# Patient Record
Sex: FEMALE | Race: WHITE | NOT HISPANIC OR LATINO | Employment: OTHER | URBAN - METROPOLITAN AREA
[De-identification: names, ages, dates, MRNs, and addresses within clinical notes are randomized per-mention and may not be internally consistent; named-entity substitution may affect disease eponyms.]

---

## 2017-07-11 ENCOUNTER — OFFICE VISIT (OUTPATIENT)
Dept: URGENT CARE | Facility: URGENT CARE | Age: 82
End: 2017-07-11
Payer: COMMERCIAL

## 2017-07-11 VITALS
WEIGHT: 106.3 LBS | OXYGEN SATURATION: 98 % | DIASTOLIC BLOOD PRESSURE: 86 MMHG | SYSTOLIC BLOOD PRESSURE: 155 MMHG | HEART RATE: 79 BPM | BODY MASS INDEX: 22.22 KG/M2 | TEMPERATURE: 97.8 F

## 2017-07-11 DIAGNOSIS — R30.0 DYSURIA: Primary | ICD-10-CM

## 2017-07-11 DIAGNOSIS — N39.0 ACUTE UTI: ICD-10-CM

## 2017-07-11 LAB
ALBUMIN UR-MCNC: NEGATIVE MG/DL
APPEARANCE UR: CLEAR
BILIRUB UR QL STRIP: NEGATIVE
COLOR UR AUTO: YELLOW
GLUCOSE UR STRIP-MCNC: NEGATIVE MG/DL
HGB UR QL STRIP: NEGATIVE
KETONES UR STRIP-MCNC: ABNORMAL MG/DL
LEUKOCYTE ESTERASE UR QL STRIP: NEGATIVE
NITRATE UR QL: NEGATIVE
NON-SQ EPI CELLS #/AREA URNS LPF: ABNORMAL /LPF
PH UR STRIP: 6.5 PH (ref 5–7)
RBC #/AREA URNS AUTO: ABNORMAL /HPF (ref 0–2)
SP GR UR STRIP: 1.02 (ref 1–1.03)
URN SPEC COLLECT METH UR: ABNORMAL
UROBILINOGEN UR STRIP-ACNC: 0.2 EU/DL (ref 0.2–1)
WBC #/AREA URNS AUTO: ABNORMAL /HPF (ref 0–2)

## 2017-07-11 PROCEDURE — 87086 URINE CULTURE/COLONY COUNT: CPT | Performed by: PHYSICIAN ASSISTANT

## 2017-07-11 PROCEDURE — 99213 OFFICE O/P EST LOW 20 MIN: CPT | Performed by: PHYSICIAN ASSISTANT

## 2017-07-11 PROCEDURE — 81001 URINALYSIS AUTO W/SCOPE: CPT | Performed by: PHYSICIAN ASSISTANT

## 2017-07-11 RX ORDER — CEFDINIR 300 MG/1
300 CAPSULE ORAL 2 TIMES DAILY
Qty: 14 CAPSULE | Refills: 0 | Status: SHIPPED | OUTPATIENT
Start: 2017-07-11 | End: 2017-07-18

## 2017-07-11 NOTE — MR AVS SNAPSHOT
After Visit Summary   7/11/2017    Shira Salas    MRN: [id number]           Patient Information     Date Of Birth          11/18/1928        Visit Information        Provider Department      7/11/2017 9:35 AM Destinee Hickman PA-C [facility]        Today's Diagnoses     Dysuria    -  1    Acute UTI          Care Instructions      ASSESSMENT:   Lower, uncomplicated urinary tract infection.    PLAN:  OMNICEF    Drink plenty of fluids.  Prevention and treatment of UTI's discussed.Signs and symptoms of pyelonephritis mentioned.   Follow up with primary care physician if not improving    Take probiotic while on antibiotic.              Follow-ups after your visit        Your next 10 appointments already scheduled     Aug 23, 2017  9:45 AM CDT   Return Visit with Jennifer Bajwa MD   [facility])    [facility]  [address] [address] [phone]              Who to contact     If you have questions or need follow up information about today's clinic visit or your schedule please contact [facility] directly at [phone].  Normal or non-critical lab and imaging results will be communicated to you by MyChart, letter or phone within 4 business days after the clinic has received the results. If you do not hear from us within 7 days, please contact the clinic through MyChart or phone. If you have a critical or abnormal lab result, we will notify you by phone as soon as possible.  Submit refill requests through [employer] or call your pharmacy and they will forward the refill request to us. Please allow 3 business days for your refill to be completed.          Additional Information About Your Visit        MyChart Information     [employer] lets you send messages to your doctor, view your test results, renew your prescriptions, schedule  "appointments and more. To sign up, go to [email or web address] . Click on \"Log in\" on the left side of the screen, which will take you to the Welcome page. Then click on \"Sign up Now\" on the right side of the page.     You will be asked to enter the access code listed below, as well as some personal information. Please follow the directions to create your username and password.     Your access code is: [email or web address]  Expires: 2017  3:16 PM     Your access code will  in 90 days. If you need help or a new code, please call your [facility] clinic or [phone].        Care EveryWhere ID     This is your Care EveryWhere ID. This could be used by other organizations to access your [facility] medical records  [id number]        Your Vitals Were     Pulse Temperature Pulse Oximetry BMI (Body Mass Index)          79 97.8  F (36.6  C) (Oral) 98% 22.22 kg/m2         Blood Pressure from Last 3 Encounters:   17 155/86   16 126/72   09/10/16 124/68    Weight from Last 3 Encounters:   17 106 lb 4.8 oz (48.2 kg)   16 103 lb 12.8 oz (47.1 kg)   09/10/16 100 lb 8 oz (45.6 kg)              We Performed the Following     UA with Microscopic reflex to Culture     Urine Culture Aerobic Bacterial          Today's Medication Changes          These changes are accurate as of: 17 10:32 AM.  If you have any questions, ask your nurse or doctor.               Start taking these medicines.        Dose/Directions    cefdinir 300 MG capsule   Commonly known as:  OMNICEF   Used for:  Acute UTI   Started by:  Destinee Hickman PA-C        Dose:  300 mg   Take 1 capsule (300 mg) by mouth 2 times daily for 7 days   Quantity:  14 capsule   Refills:  0            Where to get your medicines      These medications were sent to [facility] [address] [place], [place] - [address] AT [facility] [place]  [address], [facility] [address]     Phone:  [phone]     " cefdinir 300 MG capsule                Primary Care Provider    No Pcp Confirmed       No address on file        Equal Access to Services     HIEU SWANMISTI : Hadii maikol clark matthew Berry, wamarciada lusridharmarlineha, mekhi richmarytanya capellanbaraktanya, blane bowermukeshkatharina alvarez. So [facility] [phone].    ATENCIÓN: Si habla español, tiene a fong disposición servicios gratuitos de asistencia lingüística. Llame al [phone].    We comply with applicable federal civil rights laws and [place] laws. We do not discriminate on the basis of race, color, national origin, age, disability sex, sexual orientation or gender identity.            Thank you!     Thank you for choosing [facility]  for your care. Our goal is always to provide you with excellent care. Hearing back from our patients is one way we can continue to improve our services. Please take a few minutes to complete the written survey that you may receive in the mail after your visit with us. Thank you!             Your Updated Medication List - Protect others around you: Learn how to safely use, store and throw away your medicines at www.disposemymeds.org.          This list is accurate as of: 7/11/17 10:32 AM.  Always use your most recent med list.                   Brand Name Dispense Instructions for use Diagnosis    BLINK TEARS OP      Apply 1 drop to eye 4 times daily        calcium carbonate 1250 MG tablet    OS-APOLONIA 500 mg [place]. Ca     Take 500 mg by mouth 2 times daily        cefdinir 300 MG capsule    OMNICEF    14 capsule    Take 1 capsule (300 mg) by mouth 2 times daily for 7 days    Acute UTI       cefuroxime 500 MG tablet    CEFTIN    20 tablet    Take 1 tablet (500 mg) by mouth 2 times daily    Acute UTI       cephALEXin 500 MG capsule    KEFLEX    20 capsule    Take 1 capsule (500 mg) by mouth 2 times daily    Dysuria       * Multi-vitamin Tabs tablet      Take 1 tablet by mouth daily AREDS FOCUS        * MULTIVITAMIN PO       Take 1 tablet by mouth daily        VITAMIN D3 PO      Take by mouth daily        * Notice:  This list has 2 medication(s) that are the same as other medications prescribed for you. Read the directions carefully, and ask your doctor or other care provider to review them with you.

## 2017-07-11 NOTE — PROGRESS NOTES
SUBJECTIVE:   Shira Salas is a 88 year old female who  presents today for a possible UTI. Symptoms of urgency and frequency have been going on for 1day(s).  Hematuria no.  sudden onsetand moderate.  There is no history of fever, chills, nausea or vomiting.  No history of vaginal  discharge. This patient does have a history of urinary tract infections. Patient denies long duration, rigors, flank pain, temperature > 101 degrees F. and Vomiting, significant nausea or diarrhea.     Past Medical History:   Diagnosis Date     Glaucoma      Macular degeneration      Patient Active Problem List   Diagnosis     Primary open angle glaucoma     PXG (pseudoexfoliation glaucoma), mild stage     Pseudophakia of both eyes     PXG (pseudoexfoliation glaucoma), moderate stage     Social History   Substance Use Topics     Smoking status: Never Smoker     Smokeless tobacco: Not on file     Alcohol use Not on file       ROS:   CONSTITUTIONAL:NEGATIVE for fever, chills, change in weight  : as per HPI    OBJECTIVE:  /86 (BP Location: Left arm, Patient Position: Chair, Cuff Size: Adult Regular)  Pulse 79  Temp 97.8  F (36.6  C) (Oral)  Wt 106 lb 4.8 oz (48.2 kg)  SpO2 98%  BMI 22.22 kg/m2  GENERAL APPEARANCE: healthy, alert and no distress  ABDOMEN:  soft, nontender, no HSM or masses and bowel sounds normal  BACK: No CVA tenderness  SKIN: no suspicious lesions or rashes    ASSESSMENT:   Lower, uncomplicated urinary tract infection.    PLAN:  OMNICEF  As per ordered above  Drink plenty of fluids.  Prevention and treatment of UTI's discussed.Signs and symptoms of pyelonephritis mentioned.  Follow up with primary care physician if not improving  Take probiotic while on antibiotic.    Patient expresses understanding and agreement with the assessment and plan as above.

## 2017-07-11 NOTE — PATIENT INSTRUCTIONS
ASSESSMENT:   Lower, uncomplicated urinary tract infection.    PLAN:  OMNICEF    Drink plenty of fluids.  Prevention and treatment of UTI's discussed.Signs and symptoms of pyelonephritis mentioned.   Follow up with primary care physician if not improving    Take probiotic while on antibiotic.

## 2017-07-11 NOTE — NURSING NOTE
"Chief Complaint   Patient presents with     UTI     pt. stated that she use the restoom more than normal. pt. denies any odor or lower abdominal pain. 1x day       Initial /86 (BP Location: Left arm, Patient Position: Chair, Cuff Size: Adult Regular)  Pulse 79  Temp 97.8  F (36.6  C) (Oral)  Wt 106 lb 4.8 oz (48.2 kg)  SpO2 98%  BMI 22.22 kg/m2 Estimated body mass index is 22.22 kg/(m^2) as calculated from the following:    Height as of 12/14/16: 4' 10\" (1.473 m).    Weight as of this encounter: 106 lb 4.8 oz (48.2 kg).  Medication Reconciliation: complete    "

## 2017-07-12 LAB
BACTERIA SPEC CULT: NORMAL
MICRO REPORT STATUS: NORMAL
SPECIMEN SOURCE: NORMAL

## 2017-08-23 ENCOUNTER — OFFICE VISIT (OUTPATIENT)
Dept: OPHTHALMOLOGY | Facility: CLINIC | Age: 82
End: 2017-08-23

## 2017-08-23 DIAGNOSIS — Z96.1 PSEUDOPHAKIA OF BOTH EYES: ICD-10-CM

## 2017-08-23 DIAGNOSIS — H40.1492 PXG (PSEUDOEXFOLIATION GLAUCOMA), MODERATE STAGE: Primary | ICD-10-CM

## 2017-08-23 ASSESSMENT — REFRACTION_WEARINGRX
OD_AXIS: 001
OS_ADD: +2.50
OS_CYLINDER: +1.00
OS_AXIS: 168
OD_ADD: +2.50
OD_CYLINDER: +2.00
OS_SPHERE: -1.00
OD_SPHERE: -0.25

## 2017-08-23 ASSESSMENT — EXTERNAL EXAM - LEFT EYE: OS_EXAM: NORMAL

## 2017-08-23 ASSESSMENT — SLIT LAMP EXAM - LIDS
COMMENTS: NORMAL
COMMENTS: NORMAL

## 2017-08-23 ASSESSMENT — CONF VISUAL FIELD
OS_NORMAL: 1
OD_NORMAL: 1

## 2017-08-23 ASSESSMENT — VISUAL ACUITY
METHOD: SNELLEN - LINEAR
OD_CC: 20/20
OS_CC+: +1
CORRECTION_TYPE: GLASSES
OS_CC: 20/30

## 2017-08-23 ASSESSMENT — EXTERNAL EXAM - RIGHT EYE: OD_EXAM: NORMAL

## 2017-08-23 ASSESSMENT — TONOMETRY
IOP_METHOD: APPLANATION
OD_IOP_MMHG: 18
OS_IOP_MMHG: 15

## 2017-08-23 ASSESSMENT — CUP TO DISC RATIO
OS_RATIO: 0.45
OD_RATIO: 0.2

## 2017-08-23 NOTE — PROGRESS NOTES
1)PXG -- s/p Ahmed Valve OS (2/15/16), s/p SLT OS x4 (4/07, 6/11, 7/14 and 12/28/15), first told about glaucoma in early 2000s, H/O PXE per old notes -- K pachy: 597/611 Tmax:20/41 (OS:49 POD#1 after CE, often Z60h-65 from 9296-2697) HVF: OD:Full and OS:SN dec sens and HVF:Full on 7/14 CDR: 0.2/0.4 HRT/OCT: OD:RNFL WNL and OS:tr RNFL thinning FHX of Glc: sister/brother -- gtts, did not lose vision Gonio:open Intolerant to: Asthma/COPD:No Steroid Use: No Kidney Stones: No Sulfa Allergy:Yes, hives many years ago IOP targets:L-M20s -- IOP good  2)PCIOL OU   3)DENZEL    Patient will return to clinic in 6-8 months with visual field test (left eye only) and IOP check. Patient will start warm compresses 2x/day, increase dietary flax seed/fish oil dietary supplementation, and start artificial tears 4-6x/day as needed for burning, tearing, mattering, foreign body sensation, blurred vision, etc.  Artifical tear drops are available over the counter. Below are a list of the some of the drops available:  Refresh   Systane  Theratears  Genteal  Blink  Optive      Please avoid Visine (unless Visine Puretears), Murine or Cleareyes or Puralube tear drops.    These have ingredients that take the red out and but actually increase dryness and redness of the eyes over time    Attending Physician Attestation:  Complete documentation of historical and exam elements from today's encounter can be found in the full encounter summary report (not reduplicated in this progress note). I personally obtained the chief complaint(s) and history of present illness.  I confirmed and edited as necessary the review of systems, past medical/surgical history, family history, social history, and examination findings as documented by others; and I examined the patient myself. I personally reviewed the relevant tests, images, and reports as documented above. I formulated and edited as necessary the assessment and plan and discussed the findings and management  plan with the patient and family.  - Jennifer Bajwa MD

## 2017-08-23 NOTE — NURSING NOTE
Chief Complaints and History of Present Illnesses   Patient presents with     Follow Up For     PXG     HPI    Last Eye Exam:  11/16/16   Affected eye(s):  Both   Symptoms:        Duration:  9 months   Frequency:  Constant       Do you have eye pain now?:  No      Comments:  Pt complains of some blurry vision or eyes feeling tired a lot. Using At's prn. Noticing some floaters still, still look the same and comes and goes occasionally. FARIDA DE LA CRUZ, COA 9:52 AM 08/23/2017

## 2017-08-23 NOTE — PATIENT INSTRUCTIONS
Patient will return to clinic in 6-8 months with visual field test (left eye only) and IOP check. Patient will start warm compresses 2x/day, increase dietary flax seed/fish oil dietary supplementation, and start artificial tears 4-6x/day as needed for burning, tearing, mattering, foreign body sensation, blurred vision, etc.  Artifical tear drops are available over the counter. Below are a list of the some of the drops available:  Refresh   Systane  Theratears  Genteal  Blink  Optive      Please avoid Visine (unless Visine Puretears), Murine or Cleareyes or Puralube tear drops.    These have ingredients that take the red out and but actually increase dryness and redness of the eyes over time

## 2017-08-23 NOTE — MR AVS SNAPSHOT
After Visit Summary   8/23/2017    Shira aSlas    MRN: [id number]           Patient Information     Date Of Birth          11/18/1928        Visit Information        Provider Department      8/23/2017 9:45 AM Jennifer Bajwa MD [facility]        Today's Diagnoses     PXG (pseudoexfoliation glaucoma), moderate stage    -  1    Pseudophakia of both eyes          Care Instructions    Patient will return to clinic in 6-8 months with visual field test (left eye only) and IOP check. Patient will start warm compresses 2x/day, increase dietary flax seed/fish oil dietary supplementation, and start artificial tears 4-6x/day as needed for burning, tearing, mattering, foreign body sensation, blurred vision, etc.  Artifical tear drops are available over the counter. Below are a list of the some of the drops available:  Refresh   Systane  Theratears  Genteal  Blink  Optive      Please avoid Visine (unless Visine Puretears), Murine or Cleareyes or Puralube tear drops.    These have ingredients that take the red out and but actually increase dryness and redness of the eyes over time          Follow-ups after your visit        Follow-up notes from your care team     Return for 6-8 months with visual field test (left eye only) and IOP check.      Your next 10 appointments already scheduled     Feb 28, 2018  9:45 AM CST   Return Visit with Jennifer Bajwa MD   [facility] ([facility])    [facility]  [address] [address] [phone]              Who to contact     Please call your clinic at [phone] to:    Ask questions about your health    Make or cancel appointments    Discuss your medicines    Learn about your test results    Speak to your doctor   If you have compliments or concerns about an experience at your clinic, or if you wish to file a complaint, please contact [facility] Relations at [phone] or email us at [email or web address]         Additional Information About Your Visit        [employer] Information     [employer] is an electronic gateway that provides easy, online access to your medical records. With [employer], you can request a clinic appointment, read your test results, renew a prescription or communicate with your care team.     To sign up for [employer] visit the website at [email or web address]   You will be asked to enter the access code listed below, as well as some personal information. Please follow the directions to create your username and password.     Your access code is: [email or web address]  Expires: 2017  3:16 PM     Your access code will  in 90 days. If you need help or a new code, please contact your [facility] or call [phone] for assistance.        Care EveryWhere ID     This is your Care EveryWhere ID. This could be used by other organizations to access your [facility] medical records  [id number]         Blood Pressure from Last 3 Encounters:   17 155/86   16 126/72   09/10/16 124/68    Weight from Last 3 Encounters:   17 48.2 kg (106 lb 4.8 oz)   16 47.1 kg (103 lb 12.8 oz)   09/10/16 45.6 kg (100 lb 8 oz)              We Performed the Following     OCT Optic Nerve RNFL Spectralis OU (both eyes)     OVF 24-2 Dynamic OS        Primary Care Provider    No Pcp Confirmed       No address on file        Equal Access to Services     HIEU MIGUEL : Hadii aad ku hadasho Soomaali, waaxda luqadaha, qaybta kaalmada adeegyada, blane alvarez. So [facility] [phone].    ATENCIÓN: Si habla español, tiene a fong disposición servicios gratuitos de asistencia lingüística. Llame al [phone].    We comply with applicable federal civil rights laws and [place] laws. We do not discriminate on the basis of race, color, national origin, age,  disability sex, sexual orientation or gender identity.            Thank you!     Thank you for choosing [facility]  for your care. Our goal is always to provide you with excellent care. Hearing back from our patients is one way we can continue to improve our services. Please take a few minutes to complete the written survey that you may receive in the mail after your visit with us. Thank you!             Your Updated Medication List - Protect others around you: Learn how to safely use, store and throw away your medicines at www.disposemymeds.org.          This list is accurate as of: 8/23/17 10:19 AM.  Always use your most recent med list.                   Brand Name Dispense Instructions for use Diagnosis    BLINK TEARS OP      Apply 1 drop to eye 4 times daily        calcium carbonate 1250 MG tablet    OS-APOLONIA 500 mg [place]. Ca     Take 500 mg by mouth 2 times daily        cefuroxime 500 MG tablet    CEFTIN    20 tablet    Take 1 tablet (500 mg) by mouth 2 times daily    Acute UTI       cephALEXin 500 MG capsule    KEFLEX    20 capsule    Take 1 capsule (500 mg) by mouth 2 times daily    Dysuria       * Multi-vitamin Tabs tablet      Take 1 tablet by mouth daily AREDS FOCUS        * MULTIVITAMIN PO      Take 1 tablet by mouth daily        VITAMIN D3 PO      Take by mouth daily        * Notice:  This list has 2 medication(s) that are the same as other medications prescribed for you. Read the directions carefully, and ask your doctor or other care provider to review them with you.

## 2017-09-08 ENCOUNTER — OFFICE VISIT (OUTPATIENT)
Dept: URGENT CARE | Facility: URGENT CARE | Age: 82
End: 2017-09-08
Payer: COMMERCIAL

## 2017-09-08 VITALS
WEIGHT: 107.3 LBS | TEMPERATURE: 98.5 F | SYSTOLIC BLOOD PRESSURE: 108 MMHG | OXYGEN SATURATION: 98 % | BODY MASS INDEX: 22.43 KG/M2 | DIASTOLIC BLOOD PRESSURE: 80 MMHG | HEART RATE: 74 BPM

## 2017-09-08 DIAGNOSIS — N39.0 URINARY TRACT INFECTION WITHOUT HEMATURIA, SITE UNSPECIFIED: Primary | ICD-10-CM

## 2017-09-08 DIAGNOSIS — R30.0 DYSURIA: ICD-10-CM

## 2017-09-08 LAB

## 2017-09-08 PROCEDURE — 99213 OFFICE O/P EST LOW 20 MIN: CPT | Performed by: FAMILY MEDICINE

## 2017-09-08 PROCEDURE — 87186 SC STD MICRODIL/AGAR DIL: CPT | Performed by: FAMILY MEDICINE

## 2017-09-08 PROCEDURE — 81001 URINALYSIS AUTO W/SCOPE: CPT | Performed by: FAMILY MEDICINE

## 2017-09-08 PROCEDURE — 87086 URINE CULTURE/COLONY COUNT: CPT | Performed by: FAMILY MEDICINE

## 2017-09-08 PROCEDURE — 87088 URINE BACTERIA CULTURE: CPT | Performed by: FAMILY MEDICINE

## 2017-09-08 RX ORDER — CIPROFLOXACIN 250 MG/1
250 TABLET, FILM COATED ORAL 2 TIMES DAILY
Qty: 10 TABLET | Refills: 0 | Status: SHIPPED | OUTPATIENT
Start: 2017-09-08 | End: 2017-09-13

## 2017-09-08 NOTE — PROGRESS NOTES
SUBJECTIVE: Shira Salas is a 88 year old female who  presents today for a possible UTI.   Symptoms of dysuria and frequency have been going on for day(s).    Hematuria no.  still presentand mild and moderate.    There is no history of fever, chills, nausea or vomiting.   This patient does have a history of urinary tract infections.   Patient denies long duration and flank pain    Past Medical History:   Diagnosis Date     Glaucoma      Macular degeneration      Allergies   Allergen Reactions     Sulfa Drugs Hives     Social History   Substance Use Topics     Smoking status: Never Smoker     Smokeless tobacco: Never Used     Alcohol use Not on file       ROS: CONSTITUTIONAL:NEGATIVE for fever, chills, change in weight    OBJECTIVE:  /80  Pulse 74  Temp 98.5  F (36.9  C) (Oral)  Wt 107 lb 4.8 oz (48.7 kg)  SpO2 98%  BMI 22.43 kg/m2    No Flank/abd pain      ICD-10-CM    1. Urinary tract infection without hematuria, site unspecified N39.0 ciprofloxacin (CIPRO) 250 MG tablet   2. Dysuria R30.0 UA with Microscopic reflex to Culture     Urine Culture Aerobic Bacterial       Drink plenty of fluids.  Prevention and treatment of UTI's discussed.Signs and symptoms of pyelonephritis mentioned.  Follow up with primary care physician if not improving

## 2017-09-08 NOTE — MR AVS SNAPSHOT
"              After Visit Summary   9/8/2017    Shira Salas    MRN: [id number]           Patient Information     Date Of Birth          11/18/1928        Visit Information        Provider Department      9/8/2017 10:45 AM Jeffery Carney DO [facility]        Today's Diagnoses     Urinary tract infection without hematuria, site unspecified    -  1    Dysuria           Follow-ups after your visit        Your next 10 appointments already scheduled     Feb 28, 2018  9:45 AM CST   Return Visit with Jennifer Bajwa MD   [facility])    [facility]  [address] [facility] [address]  [facility] [address] [phone]              Who to contact     If you have questions or need follow up information about today's clinic visit or your schedule please contact [facility] directly at [phone].  Normal or non-critical lab and imaging results will be communicated to you by MyChart, letter or phone within 4 business days after the clinic has received the results. If you do not hear from us within 7 days, please contact the clinic through cottonTrackshart or phone. If you have a critical or abnormal lab result, we will notify you by phone as soon as possible.  Submit refill requests through [employer]illance or call your pharmacy and they will forward the refill request to us. Please allow 3 business days for your refill to be completed.          Additional Information About Your Visit        cottonTracksharCatalystPharma Information     E[employer] lets you send messages to your doctor, view your test results, renew your prescriptions, schedule appointments and more. To sign up, go to [email or web address] . Click on \"Log in\" on the left side of the screen, which will take you to the Welcome page. Then click on \"Sign up Now\" on the right side of the page.     You will be asked to enter the access code listed below, as well as some " personal information. Please follow the directions to create your username and password.     Your access code is: [email or web address]  Expires: 2017 11:23 AM     Your access code will  in 90 days. If you need help or a new code, please call your [place] clinic or [phone].        Care EveryWhere ID     This is your Care EveryWhere ID. This could be used by other organizations to access your [place] medical records  [id number]        Your Vitals Were     Pulse Temperature Pulse Oximetry BMI (Body Mass Index)          74 98.5  F (36.9  C) (Oral) 98% 22.43 kg/m2         Blood Pressure from Last 3 Encounters:   17 108/80   17 155/86   16 126/72    Weight from Last 3 Encounters:   17 107 lb 4.8 oz (48.7 kg)   17 106 lb 4.8 oz (48.2 kg)   16 103 lb 12.8 oz (47.1 kg)              We Performed the Following     UA with Microscopic reflex to Culture     Urine Culture Aerobic Bacterial          Today's Medication Changes          These changes are accurate as of: 17 11:23 AM.  If you have any questions, ask your nurse or doctor.               Start taking these medicines.        Dose/Directions    ciprofloxacin 250 MG tablet   Commonly known as:  CIPRO   Used for:  Urinary tract infection without hematuria, site unspecified   Started by:  Jeffery Carney DO        Dose:  250 mg   Take 1 tablet (250 mg) by mouth 2 times daily for 5 days   Quantity:  10 tablet   Refills:  0            Where to get your medicines      These medications were sent to [facility] [id number] [place] [address] [address] AT [facility] [address]  [address], [facility] [address]     Phone:  [phone]     ciprofloxacin 250 MG tablet                Primary Care Provider    No Pcp Confirmed       No address on file        Equal Access to Services     HIEU MIGUEL AH: Princess Berry, raine wasserman, blane dove  lenorajose chelimukeshkatharina fisherJohnaajohnson ah. So [facility] [phone].    ATENCIÓN: Si curt west, tiene a fong disposición servicios gratuitos de asistencia lingüística. Donald rangel [phone].    We comply with applicable federal civil rights laws and [place] laws. We do not discriminate on the basis of race, color, national origin, age, disability sex, sexual orientation or gender identity.            Thank you!     Thank you for choosing [facility]  for your care. Our goal is always to provide you with excellent care. Hearing back from our patients is one way we can continue to improve our services. Please take a few minutes to complete the written survey that you may receive in the mail after your visit with us. Thank you!             Your Updated Medication List - Protect others around you: Learn how to safely use, store and throw away your medicines at www.disposemymeds.org.          This list is accurate as of: 9/8/17 11:23 AM.  Always use your most recent med list.                   Brand Name Dispense Instructions for use Diagnosis    BLINK TEARS OP      Apply 1 drop to eye 4 times daily        calcium carbonate 1250 MG tablet    OS-APOLONIA 500 mg [place]. Ca     Take 500 mg by mouth 2 times daily        cefuroxime 500 MG tablet    CEFTIN    20 tablet    Take 1 tablet (500 mg) by mouth 2 times daily    Acute UTI       cephALEXin 500 MG capsule    KEFLEX    20 capsule    Take 1 capsule (500 mg) by mouth 2 times daily    Dysuria       ciprofloxacin 250 MG tablet    CIPRO    10 tablet    Take 1 tablet (250 mg) by mouth 2 times daily for 5 days    Urinary tract infection without hematuria, site unspecified       * Multi-vitamin Tabs tablet      Take 1 tablet by mouth daily AREDS FOCUS        * MULTIVITAMIN PO      Take 1 tablet by mouth daily        VITAMIN D3 PO      Take by mouth daily        * Notice:  This list has 2 medication(s) that are the same as other medications prescribed for you. Read the  directions carefully, and ask your doctor or other care provider to review them with you.

## 2017-09-08 NOTE — NURSING NOTE
"Chief Complaint   Patient presents with     UTI     pt states frequency, disocmfort, funy sensation 3x days        Initial /80  Pulse 74  Temp 98.5  F (36.9  C) (Oral)  Wt 107 lb 4.8 oz (48.7 kg)  SpO2 98%  BMI 22.43 kg/m2 Estimated body mass index is 22.43 kg/(m^2) as calculated from the following:    Height as of 12/14/16: 4' 10\" (1.473 m).    Weight as of this encounter: 107 lb 4.8 oz (48.7 kg).  Medication Reconciliation: complete    "

## 2017-09-09 LAB
BACTERIA SPEC CULT: ABNORMAL
BACTERIA SPEC CULT: ABNORMAL
SPECIMEN SOURCE: ABNORMAL

## 2018-02-20 ENCOUNTER — OFFICE VISIT (OUTPATIENT)
Dept: URGENT CARE | Facility: URGENT CARE | Age: 83
End: 2018-02-20
Payer: COMMERCIAL

## 2018-02-20 VITALS
SYSTOLIC BLOOD PRESSURE: 142 MMHG | BODY MASS INDEX: 22.06 KG/M2 | DIASTOLIC BLOOD PRESSURE: 76 MMHG | WEIGHT: 105.56 LBS | OXYGEN SATURATION: 95 % | TEMPERATURE: 97.9 F | RESPIRATION RATE: 10 BRPM | HEART RATE: 83 BPM

## 2018-02-20 DIAGNOSIS — R30.0 DYSURIA: Primary | ICD-10-CM

## 2018-02-20 DIAGNOSIS — R35.0 URINARY FREQUENCY: ICD-10-CM

## 2018-02-20 LAB
ALBUMIN UR-MCNC: NEGATIVE MG/DL
APPEARANCE UR: CLEAR
BACTERIA #/AREA URNS HPF: ABNORMAL /HPF
BILIRUB UR QL STRIP: ABNORMAL
COLOR UR AUTO: YELLOW
GLUCOSE UR STRIP-MCNC: NEGATIVE MG/DL
HGB UR QL STRIP: NEGATIVE
KETONES UR STRIP-MCNC: 15 MG/DL
LEUKOCYTE ESTERASE UR QL STRIP: ABNORMAL
NITRATE UR QL: NEGATIVE
NON-SQ EPI CELLS #/AREA URNS LPF: ABNORMAL /LPF
PH UR STRIP: 6 PH (ref 5–7)
RBC #/AREA URNS AUTO: ABNORMAL /HPF
SOURCE: ABNORMAL
SP GR UR STRIP: 1.02 (ref 1–1.03)
UROBILINOGEN UR STRIP-ACNC: 0.2 EU/DL (ref 0.2–1)
WBC #/AREA URNS AUTO: ABNORMAL /HPF

## 2018-02-20 PROCEDURE — 81001 URINALYSIS AUTO W/SCOPE: CPT | Performed by: PHYSICIAN ASSISTANT

## 2018-02-20 PROCEDURE — 99213 OFFICE O/P EST LOW 20 MIN: CPT | Performed by: PHYSICIAN ASSISTANT

## 2018-02-20 PROCEDURE — 87086 URINE CULTURE/COLONY COUNT: CPT | Performed by: PHYSICIAN ASSISTANT

## 2018-02-20 RX ORDER — CEFDINIR 300 MG/1
300 CAPSULE ORAL 2 TIMES DAILY
Qty: 14 CAPSULE | Refills: 0 | Status: SHIPPED | OUTPATIENT
Start: 2018-02-20 | End: 2019-11-14

## 2018-02-20 NOTE — PROGRESS NOTES
SUBJECTIVE:   Shira Salas is a 89 year old female who  presents today for a possible UTI. Symptoms of frequency and dysuria have been going on for 3day(s).  Hematuria no.  still presentand mild.  There is no history of fever, chills, nausea or vomiting.   This patient does  have a history of urinary tract infections. Patient denies long duration, rigors, flank pain, temperature > 101 degrees F. and Vomiting, significant nausea or diarrhea or vaginal discharge     Past Medical History:   Diagnosis Date     Glaucoma      Macular degeneration      Allergies   Allergen Reactions     Sulfa Drugs Hives     Social History   Substance Use Topics     Smoking status: Never Smoker     Smokeless tobacco: Never Used     Alcohol use Not on file       ROS:   CONSTITUTIONAL:NEGATIVE for fever, chills, change in weight  INTEGUMENTARY/SKIN: NEGATIVE for worrisome rashes, moles or lesions  RESP:NEGATIVE for significant cough or SOB  CV: NEGATIVE for chest pain, palpitations or peripheral edema  : Positive for urinary frequency and dysuria  MUSCULOSKELETAL: NEGATIVE for significant arthralgias or myalgia  NEURO: NEGATIVE for weakness, dizziness or paresthesias    OBJECTIVE:  /76  Pulse 83  Temp 97.9  F (36.6  C) (Oral)  Resp 10  Wt 105 lb 9 oz (47.9 kg)  SpO2 95%  BMI 22.06 kg/m2  GENERAL APPEARANCE: healthy, alert and no distress  RESP: lungs clear to auscultation - no rales, rhonchi or wheezes  CV: regular rates and rhythm, normal S1 S2, no murmur noted  ABDOMEN:  soft, nontender, no HSM or masses and bowel sounds normal  BACK: No CVA tenderness  SKIN: no suspicious lesions or rashes        ASSESSMENT:   Urinary frequency  Dysuria     PLAN:  Orders Placed This Encounter     UA with Microscopic reflex to Culture     Flaxseed, Linseed, (FLAX SEEDS PO)     Probiotic Product (PROBIOTIC DAILY PO)     cefdinir (OMNICEF) 300 MG capsule       Urine culture pending  Drink plenty of fluids.  Prevention and treatment of UTI's  discussed.Signs and symptoms of pyelonephritis mentioned.  Follow up with primary care physician if not improving

## 2018-02-20 NOTE — MR AVS SNAPSHOT
"              After Visit Summary   2/20/2018    Shira Salas    MRN: [id number]           Patient Information     Date Of Birth          11/18/1928        Visit Information        Provider Department      2/20/2018 9:05 AM Judson Carreno PA-C [facility]        Today's Diagnoses     Dysuria    -  1    Urinary frequency           Follow-ups after your visit        Your next 10 appointments already scheduled     Mar 09, 2018  9:00 AM CST   RETURN GLAUCOMA with Jennifer Bajwa MD   [facility])    [facility]  [address] [address] [phone]              Who to contact     If you have questions or need follow up information about today's clinic visit or your schedule please contact [facility] directly at [phone].  Normal or non-critical lab and imaging results will be communicated to you by MyChart, letter or phone within 4 business days after the clinic has received the results. If you do not hear from us within 7 days, please contact the clinic through Stockrhart or phone. If you have a critical or abnormal lab result, we will notify you by phone as soon as possible.  Submit refill requests through [employer] or call your pharmacy and they will forward the refill request to us. Please allow 3 business days for your refill to be completed.          Additional Information About Your Visit        MyChart Information     [employer] lets you send messages to your doctor, view your test results, renew your prescriptions, schedule appointments and more. To sign up, go to [email or web address] . Click on \"Log in\" on the left side of the screen, which will take you to the Welcome page. Then click on \"Sign up Now\" on the right side of the page.     You will be asked to enter the access code listed below, as well as some personal information. Please follow " the directions to create your username and password.     Your access code is: [email or web address]  Expires: 2018 10:10 AM     Your access code will  in 90 days. If you need help or a new code, please call your [place] clinic or [phone].        Care EveryWhere ID     This is your Care EveryWhere ID. This could be used by other organizations to access your [place] medical records  [id number]        Your Vitals Were     Pulse Temperature Respirations Pulse Oximetry BMI (Body Mass Index)       83 97.9  F (36.6  C) (Oral) 10 95% 22.06 kg/m2        Blood Pressure from Last 3 Encounters:   18 142/76   17 108/80   17 155/86    Weight from Last 3 Encounters:   18 105 lb 9 oz (47.9 kg)   17 107 lb 4.8 oz (48.7 kg)   17 106 lb 4.8 oz (48.2 kg)              We Performed the Following     UA with Microscopic reflex to Culture     Urine Culture Aerobic Bacterial          Today's Medication Changes          These changes are accurate as of 18 10:10 AM.  If you have any questions, ask your nurse or doctor.               Start taking these medicines.        Dose/Directions    cefdinir 300 MG capsule   Commonly known as:  OMNICEF   Used for:  Dysuria, Urinary frequency   Started by:  Judson Carreno PA-C        Dose:  300 mg   Take 1 capsule (300 mg) by mouth 2 times daily   Quantity:  14 capsule   Refills:  0         Stop taking these medicines if you haven't already. Please contact your care team if you have questions.     BLINK TEARS OP   Stopped by:  Judson Carreno PA-C           cefuroxime 500 MG tablet   Commonly known as:  CEFTIN   Stopped by:  Judson Carreno PA-C           cephALEXin 500 MG capsule   Commonly known as:  KEFLEX   Stopped by:  Judson Carreno PA-C                Where to get your medicines      These medications [facility] to [facility] [address] [place] [address] - [address] AT [facility] [address] [place]  [address],  [facility] [address]     Phone:  [phone]     cefdinir 300 MG capsule                Primary Care Provider Fax #    Physician No Ref-Primary [phone]       No address on file        Equal Access to Services     HIEU MIGUEL : Hadii aad ku hadevelia Berry, wamarciada luqadaha, qaybta kaalmada adehosea, blane riley laJohnherbert alvarez. So [facility] [phone].    ATENCIÓN: Si habla español, tiene a fong disposición servicios gratuitos de asistencia lingüística. Llame al [phone].    We comply with applicable federal civil rights laws and [place] laws. We do not discriminate on the basis of race, color, national origin, age, disability, sex, sexual orientation, or gender identity.            Thank you!     Thank you for choosing [facility]  for your care. Our goal is always to provide you with excellent care. Hearing back from our patients is one way we can continue to improve our services. Please take a few minutes to complete the written survey that you may receive in the mail after your visit with us. Thank you!             Your Updated Medication List - Protect others around you: Learn how to safely use, store and throw away your medicines at www.disposemymeds.org.          This list is accurate as of 2/20/18 10:10 AM.  Always use your most recent med list.                   Brand Name Dispense Instructions for use Diagnosis    calcium carbonate 1250 MG tablet    OS-APOLONIA 500 mg [place]. Ca     Take 500 mg by mouth 2 times daily        cefdinir 300 MG capsule    OMNICEF    14 capsule    Take 1 capsule (300 mg) by mouth 2 times daily    Dysuria, Urinary frequency       FLAX SEEDS PO      Take 1 capsule by mouth daily        * Multi-vitamin Tabs tablet      Take 1 tablet by mouth daily AREDS FOCUS        * MULTIVITAMIN PO      Take 1 tablet by mouth daily        PROBIOTIC DAILY PO      Take 1 tablet by mouth daily        VITAMIN D3 PO      Take by mouth daily        *  Notice:  This list has 2 medication(s) that are the same as other medications prescribed for you. Read the directions carefully, and ask your doctor or other care provider to review them with you.

## 2018-02-21 LAB
BACTERIA SPEC CULT: NORMAL
SPECIMEN SOURCE: NORMAL

## 2018-03-09 ENCOUNTER — OFFICE VISIT (OUTPATIENT)
Dept: OPHTHALMOLOGY | Facility: CLINIC | Age: 83
End: 2018-03-09
Payer: COMMERCIAL

## 2018-03-09 DIAGNOSIS — Z96.1 PSEUDOPHAKIA OF BOTH EYES: ICD-10-CM

## 2018-03-09 DIAGNOSIS — H40.1492 PXG (PSEUDOEXFOLIATION GLAUCOMA), MODERATE STAGE: Primary | ICD-10-CM

## 2018-03-09 ASSESSMENT — TONOMETRY
IOP_METHOD: APPLANATION
OD_IOP_MMHG: 18
OS_IOP_MMHG: 12

## 2018-03-09 ASSESSMENT — REFRACTION_MANIFEST
OS_SPHERE: -1.00
OD_AXIS: 180
OS_ADD: +3.00
OS_CYLINDER: +1.25
OS_AXIS: 165
OD_CYLINDER: +2.00
OD_SPHERE: PLANO
OD_ADD: +3.00

## 2018-03-09 ASSESSMENT — VISUAL ACUITY
OS_CC: 20/25
CORRECTION_TYPE: GLASSES
OD_CC+: -
METHOD: SNELLEN - LINEAR
OD_CC: 20/20

## 2018-03-09 ASSESSMENT — CONF VISUAL FIELD
METHOD: COUNTING FINGERS
OD_NORMAL: 1
OS_NORMAL: 1

## 2018-03-09 ASSESSMENT — SLIT LAMP EXAM - LIDS
COMMENTS: NORMAL
COMMENTS: NORMAL

## 2018-03-09 ASSESSMENT — EXTERNAL EXAM - RIGHT EYE: OD_EXAM: NORMAL

## 2018-03-09 ASSESSMENT — EXTERNAL EXAM - LEFT EYE: OS_EXAM: NORMAL

## 2018-03-09 NOTE — MR AVS SNAPSHOT
After Visit Summary   3/9/2018    Shira Salas    MRN: [id number]           Patient Information     Date Of Birth          11/18/1928        Visit Information        Provider Department      3/9/2018 9:00 AM Jennifer Bajwa MD [facility]        Today's Diagnoses     Pxg (pseudoexfoliation glaucoma), moderate stage    -  1    Pseudophakia of both eyes          Care Instructions    Patient will return to clinic in 6-8 months with visual field test, dilated eye exam, OCT with RNFL analysis and IOP check.     Patient will start warm compresses 2x/day, increase dietary flax seed/fish oil dietary supplementation, and start artificial tears 4-6x/day as needed for burning, tearing, mattering, foreign body sensation, blurred vision, etc.  Artifical tear drops are available over the counter. Below are a list of the some of the drops available:  Refresh   Systane  Theratears  Genteal  Blink  Optive      Please avoid Visine (unless Visine Puretears), Murine or Cleareyes or Puralube tear drops.    These have ingredients that take the red out and but actually increase dryness and redness of the eyes over time          Follow-ups after your visit        Follow-up notes from your care team     Return 6-8 months with visual field test, dilated eye exam, OCT with RNFL analysis and IOP check. .      Who to contact     Please call your clinic at [phone] to:    Ask questions about your health    Make or cancel appointments    Discuss your medicines    Learn about your test results    Speak to your doctor            Additional Information About Your Visit        MyChart Information     NanoVib[employer] is an electronic gateway that provides easy, online access to your medical records. With NanoVib[employer], you can request a clinic appointment, read your test results, renew a prescription or communicate with your care team.     To sign up for Elliptic Technologiest visit the website at [email or web address]    You will be asked to enter the access code listed below, as well as some personal information. Please follow the directions to create your username and password.     Your access code is: [email or web address]  Expires: 2018 10:10 AM     Your access code will  in 90 days. If you need help or a new code, please contact your [facility] or call [phone] for assistance.        Care EveryWhere ID     This is your Care EveryWhere ID. This could be used by other organizations to access your [place] medical records  [id number]         Blood Pressure from Last 3 Encounters:   18 142/76   17 108/80   17 155/86    Weight from Last 3 Encounters:   18 47.9 kg (105 lb 9 oz)   17 48.7 kg (107 lb 4.8 oz)   17 48.2 kg (106 lb 4.8 oz)              Today, you had the following     No orders found for display       Primary Care Provider Fax #    Physician No Ref-Primary [phone]       No address on file        Equal Access to Services     [facility] : Hadii maikol Berry, waaxda lurashida, qaybta kaalnanda brown, blane rooney . So [facility] [phone].    ATENCIÓN: Si habla español, tiene a fong disposición servicios gratuitos de asistencia lingüística. [facility] [phone].    We comply with applicable federal civil rights laws and [place] laws. We do not discriminate on the basis of race, color, national origin, age, disability, sex, sexual orientation, or gender identity.            Thank you!     Thank you for choosing [facility]  for your care. Our goal is always to provide you with excellent care. Hearing back from our patients is one way we can continue to improve our services. Please take a few minutes to complete the written survey that you may receive in the mail after your visit with us. Thank you!             Your Updated Medication List - Protect others around you: Learn how  to safely use, store and throw away your medicines at www.disposemymeds.org.          This list is accurate as of 3/9/18 10:01 AM.  Always use your most recent med list.                   Brand Name Dispense Instructions for use Diagnosis    calcium carbonate 1250 MG tablet    OS-APOLONIA 500 mg [place]. Ca     Take 500 mg by mouth 2 times daily        cefdinir 300 MG capsule    OMNICEF    14 capsule    Take 1 capsule (300 mg) by mouth 2 times daily    Dysuria, Urinary frequency       FLAX SEEDS PO      Take 1 capsule by mouth daily        * Multi-vitamin Tabs tablet      Take 1 tablet by mouth daily AREDS FOCUS        * MULTIVITAMIN PO      Take 1 tablet by mouth daily        PROBIOTIC DAILY PO      Take 1 tablet by mouth daily        VITAMIN D3 PO      Take by mouth daily        * Notice:  This list has 2 medication(s) that are the same as other medications prescribed for you. Read the directions carefully, and ask your doctor or other care provider to review them with you.

## 2018-03-09 NOTE — NURSING NOTE
Chief Complaints and History of Present Illnesses   Patient presents with     Glaucoma Follow Up     HPI    Affected eye(s):  Both   Symptoms:     Difficulty with reading   No floaters   Dryness   No eye discharge      Duration:  4 months   Frequency:  Constant       Do you have eye pain now?:  No      Comments:  Blink for dry eyes / tid to BE.    Flori ALEXANDRA 9:30 AM 03/09/2018

## 2018-03-09 NOTE — PROGRESS NOTES
1)PXG -- s/p Ahmed Valve OS (2/15/16), s/p SLT OS x4 (4/07, 6/11, 7/14 and 12/28/15), first told about glaucoma in early 2000s, H/O PXE per old notes -- K pachy: 597/611 Tmax:20/41 (OS:49 POD#1 after CE, often V80o-09 from 4569-0778) HVF: OD:Full and OS:SN dec sens and HVF:Full on 7/14 CDR: 0.2/0.4 HRT/OCT: OD:RNFL WNL and OS:tr RNFL thinning FHX of Glc: sister/brother -- gtts, did not lose vision Gonio:open Intolerant to: Asthma/COPD:No Steroid Use: No Kidney Stones: No Sulfa Allergy:Yes, hives many years ago IOP targets:L-M20s -- IOP good  2)PCIOL OU   3)DENZEL    Patient will return to clinic in 6-8 months with visual field test, dilated eye exam, OCT with RNFL analysis and IOP check.     Patient will start warm compresses 2x/day, increase dietary flax seed/fish oil dietary supplementation, and start artificial tears 4-6x/day as needed for burning, tearing, mattering, foreign body sensation, blurred vision, etc.  Artifical tear drops are available over the counter. Below are a list of the some of the drops available:  Refresh   Systane  Theratears  Genteal  Blink  Optive      Please avoid Visine (unless Visine Puretears), Murine or Cleareyes or Puralube tear drops.    These have ingredients that take the red out and but actually increase dryness and redness of the eyes over time    Attending Physician Attestation:  Complete documentation of historical and exam elements from today's encounter can be found in the full encounter summary report (not reduplicated in this progress note). I personally obtained the chief complaint(s) and history of present illness.  I confirmed and edited as necessary the review of systems, past medical/surgical history, family history, social history, and examination findings as documented by others; and I examined the patient myself. I personally reviewed the relevant tests, images, and reports as documented above. I formulated and edited as necessary the assessment and plan and discussed  the findings and management plan with the patient and family.  - Jennifer Bajwa MD

## 2018-03-09 NOTE — PATIENT INSTRUCTIONS
Patient will return to clinic in 6-8 months with visual field test, dilated eye exam, OCT with RNFL analysis and IOP check.     Patient will start warm compresses 2x/day, increase dietary flax seed/fish oil dietary supplementation, and start artificial tears 4-6x/day as needed for burning, tearing, mattering, foreign body sensation, blurred vision, etc.  Artifical tear drops are available over the counter. Below are a list of the some of the drops available:  Refresh   Systane  Theratears  Genteal  Blink  Optive      Please avoid Visine (unless Visine Puretears), Murine or Cleareyes or Puralube tear drops.    These have ingredients that take the red out and but actually increase dryness and redness of the eyes over time

## 2019-02-08 ENCOUNTER — OFFICE VISIT (OUTPATIENT)
Dept: OPHTHALMOLOGY | Facility: CLINIC | Age: 84
End: 2019-02-08
Payer: COMMERCIAL

## 2019-02-08 DIAGNOSIS — Z96.1 PSEUDOPHAKIA OF BOTH EYES: ICD-10-CM

## 2019-02-08 DIAGNOSIS — H40.1492 PXG (PSEUDOEXFOLIATION GLAUCOMA), MODERATE STAGE: Primary | ICD-10-CM

## 2019-02-08 ASSESSMENT — REFRACTION_WEARINGRX
OD_SPHERE: PLANO
OS_SPHERE: -0.75
OD_CYLINDER: +2.00
OS_AXIS: 165
OD_AXIS: 180
OS_ADD: +3.00
OS_CYLINDER: +1.00
OD_ADD: +3.00

## 2019-02-08 ASSESSMENT — CUP TO DISC RATIO
OD_RATIO: 0.2
OS_RATIO: 0.45

## 2019-02-08 ASSESSMENT — VISUAL ACUITY
OS_CC: 20/20
CORRECTION_TYPE: GLASSES
OS_CC+: -2
OD_CC: 20/20
OD_CC+: -2
METHOD: SNELLEN - LINEAR

## 2019-02-08 ASSESSMENT — TONOMETRY
IOP_METHOD: APPLANATION
OD_IOP_MMHG: 23
IOP_METHOD: APPLANATION
OS_IOP_MMHG: 13
OS_IOP_MMHG: 15
OD_IOP_MMHG: 20

## 2019-02-08 ASSESSMENT — EXTERNAL EXAM - RIGHT EYE: OD_EXAM: NORMAL

## 2019-02-08 ASSESSMENT — CONF VISUAL FIELD
METHOD: COUNTING FINGERS
OD_NORMAL: 1
OS_NORMAL: 1

## 2019-02-08 ASSESSMENT — SLIT LAMP EXAM - LIDS
COMMENTS: NORMAL
COMMENTS: NORMAL

## 2019-02-08 ASSESSMENT — EXTERNAL EXAM - LEFT EYE: OS_EXAM: NORMAL

## 2019-02-08 NOTE — PATIENT INSTRUCTIONS
Patient will return to clinic in 6-8 months with visual field test.       Patient will start warm compresses 2x/day, increase dietary flax seed/fish oil dietary supplementation, and start artificial tears 4-6x/day as needed for burning, tearing, mattering, foreign body sensation, blurred vision, etc.  Artifical tear drops are available over the counter. Below are a list of the some of the drops available:  Refresh   Systane  Theratears  Genteal  Blink  Optive      Please avoid Visine (unless Visine Puretears), Murine or Cleareyes or Puralube tear drops.    These have ingredients that take the red out and but actually increase dryness and redness of the eyes over time

## 2019-02-08 NOTE — NURSING NOTE
Chief Complaints and History of Present Illnesses   Patient presents with     Glaucoma     Chief Complaint(s) and History of Present Illness(es)     Glaucoma     Laterality: both eyes    Quality: States va is the same since last visit                Comments     +geena Adkins COT 9:52 AM February 8, 2019

## 2019-02-08 NOTE — PROGRESS NOTES
1)PXG -- s/p Ahmed Valve OS (2/15/16), s/p SLT OS x4 (4/07, 6/11, 7/14 and 12/28/15), first told about glaucoma in early 2000s, H/O PXE per old notes -- K pachy: 597/611 Tmax:23/41 (OS:49 POD#1 after CE, often V23s-54 from [id number]) HVF: OD:Full and OS:SN dec sens and HVF:Full on 7/14 CDR: 0.2/0.4 HRT/OCT: OD:RNFL WNL and OS:tr RNFL thinning FHX of Glc: sister/brother -- gtts, did not lose vision Gonio:open Intolerant to: Asthma/COPD:No Steroid Use: No Kidney Stones: No Sulfa Allergy:Yes, hives many years ago IOP targets:L-M20s -- IOP good  2)PCIOL OU   3)DENZEL    Patient will return to clinic in 6-8 months with visual field test.       Patient will start warm compresses 2x/day, increase dietary flax seed/fish oil dietary supplementation, and start artificial tears 4-6x/day as needed for burning, tearing, mattering, foreign body sensation, blurred vision, etc.  Artifical tear drops are available over the counter. Below are a list of the some of the drops available:  Refresh   Systane  Theratears  Genteal  Blink  Optive      Please avoid Visine (unless Visine Puretears), Murine or Cleareyes or Puralube tear drops.    These have ingredients that take the red out and but actually increase dryness and redness of the eyes over time    Attending Physician Attestation:  Complete documentation of historical and exam elements from today's encounter can be found in the full encounter summary report (not reduplicated in this progress note). I personally obtained the chief complaint(s) and history of present illness.  I confirmed and edited as necessary the review of systems, past medical/surgical history, family history, social history, and examination findings as documented by others; and I examined the patient myself. I personally reviewed the relevant tests, images, and reports as documented above. I formulated and edited as necessary the assessment and plan and discussed the findings and management plan with the patient and  family.  - Jennifer Bajwa MD

## 2019-03-12 ENCOUNTER — OFFICE VISIT (OUTPATIENT)
Dept: URGENT CARE | Facility: URGENT CARE | Age: 84
End: 2019-03-12
Payer: COMMERCIAL

## 2019-03-12 VITALS
BODY MASS INDEX: 22.03 KG/M2 | SYSTOLIC BLOOD PRESSURE: 162 MMHG | WEIGHT: 105.4 LBS | OXYGEN SATURATION: 97 % | TEMPERATURE: 98.5 F | DIASTOLIC BLOOD PRESSURE: 79 MMHG | HEART RATE: 73 BPM

## 2019-03-12 DIAGNOSIS — R30.0 DYSURIA: ICD-10-CM

## 2019-03-12 DIAGNOSIS — N39.0 ACUTE UTI: Primary | ICD-10-CM

## 2019-03-12 LAB
ALBUMIN UR-MCNC: NEGATIVE MG/DL
APPEARANCE UR: CLEAR
BILIRUB UR QL STRIP: NEGATIVE
COLOR UR AUTO: YELLOW
GLUCOSE UR STRIP-MCNC: NEGATIVE MG/DL
HGB UR QL STRIP: NEGATIVE
KETONES UR STRIP-MCNC: NEGATIVE MG/DL
LEUKOCYTE ESTERASE UR QL STRIP: NEGATIVE
NITRATE UR QL: NEGATIVE
PH UR STRIP: 7.5 PH (ref 5–7)
RBC #/AREA URNS AUTO: ABNORMAL /HPF
SOURCE: ABNORMAL
SP GR UR STRIP: 1.01 (ref 1–1.03)
UROBILINOGEN UR STRIP-ACNC: 0.2 EU/DL (ref 0.2–1)
WBC #/AREA URNS AUTO: ABNORMAL /HPF

## 2019-03-12 PROCEDURE — 99213 OFFICE O/P EST LOW 20 MIN: CPT | Performed by: PHYSICIAN ASSISTANT

## 2019-03-12 PROCEDURE — 81001 URINALYSIS AUTO W/SCOPE: CPT | Performed by: PHYSICIAN ASSISTANT

## 2019-03-12 PROCEDURE — 87086 URINE CULTURE/COLONY COUNT: CPT | Performed by: PHYSICIAN ASSISTANT

## 2019-03-12 RX ORDER — CEFUROXIME AXETIL 250 MG/1
250 TABLET ORAL 2 TIMES DAILY
Qty: 10 TABLET | Refills: 0 | Status: SHIPPED | OUTPATIENT
Start: 2019-03-12 | End: 2019-03-17

## 2019-03-12 NOTE — PROGRESS NOTES
"Patient presents with:  Urgent Care  UTI: sxs 1x days     SUBJECTIVE:   Shira Salas is a 89 year old female who  presents today for a possible UTI. Symptoms of frequency have been going since yesterday.      Complains of \"feeling ishy\" today.     No fevers.     In past has had a negative urine culture when she has had urinary frequency, but today she also has an \"ill feeling\".     Denies any nausea or vomiting.        Past Medical History:   Diagnosis Date     Glaucoma      Macular degeneration      Patient Active Problem List   Diagnosis     Primary open angle glaucoma     Pxg (pseudoexfoliation glaucoma), mild stage     Pseudophakia of both eyes     Pxg (pseudoexfoliation glaucoma), moderate stage     Social History     Tobacco Use     Smoking status: Never Smoker     Smokeless tobacco: Never Used   Substance Use Topics     Alcohol use: Not on file       ROS:   CONSTITUTIONAL:NEGATIVE for fever, chills, change in weight  INTEGUMENTARY/SKIN: NEGATIVE for worrisome rashes, moles or lesions  ENT/MOUTH: NEGATIVE for ear, mouth and throat problems  RESP:NEGATIVE for significant cough or SOB  CV: NEGATIVE for chest pain, palpitations or peripheral edema  : as per HPI  MUSCULOSKELETAL: NEGATIVE for significant arthralgias or myalgia  Review of systems negative except as stated above.    OBJECTIVE:  /79   Pulse 73   Temp 98.5  F (36.9  C) (Oral)   Wt 47.8 kg (105 lb 6.4 oz)   SpO2 97%   BMI 22.03 kg/m    GENERAL APPEARANCE: healthy, alert and no distress  ABDOMEN:  soft, nontender, no HSM or masses and bowel sounds normal  BACK: No CVA tenderness  SKIN: no suspicious lesions or rashes    ASSESSMENT:   Lower, uncomplicated urinary tract infection.    PLAN:  CEFTIN  As per ordered above  Drink plenty of fluids.  Prevention and treatment of UTI's discussed.Signs and symptoms of pyelonephritis mentioned.  Follow up with primary care physician if not improving    "

## 2019-03-12 NOTE — PATIENT INSTRUCTIONS
(N39.0) Acute UTI  (primary encounter diagnosis)  Comment:   Plan: cefuroxime (CEFTIN) 250 MG tablet          Plan: UA with Microscopic reflex to Culture  Culture pending

## 2019-03-14 LAB
BACTERIA SPEC CULT: NORMAL
SPECIMEN SOURCE: NORMAL

## 2019-04-26 ENCOUNTER — TELEPHONE (OUTPATIENT)
Dept: OPHTHALMOLOGY | Facility: CLINIC | Age: 84
End: 2019-04-26

## 2019-08-08 ENCOUNTER — OFFICE VISIT (OUTPATIENT)
Dept: URGENT CARE | Facility: URGENT CARE | Age: 84
End: 2019-08-08
Payer: COMMERCIAL

## 2019-08-08 VITALS
SYSTOLIC BLOOD PRESSURE: 122 MMHG | OXYGEN SATURATION: 98 % | RESPIRATION RATE: 16 BRPM | TEMPERATURE: 97.6 F | WEIGHT: 103 LBS | BODY MASS INDEX: 21.62 KG/M2 | DIASTOLIC BLOOD PRESSURE: 80 MMHG | HEIGHT: 58 IN | HEART RATE: 72 BPM

## 2019-08-08 DIAGNOSIS — R30.0 DYSURIA: Primary | ICD-10-CM

## 2019-08-08 DIAGNOSIS — E86.0 DEHYDRATION: ICD-10-CM

## 2019-08-08 LAB
ALBUMIN UR-MCNC: NEGATIVE MG/DL
APPEARANCE UR: CLEAR
BILIRUB UR QL STRIP: NEGATIVE
COLOR UR AUTO: YELLOW
GLUCOSE UR STRIP-MCNC: NEGATIVE MG/DL
HGB UR QL STRIP: NEGATIVE
KETONES UR STRIP-MCNC: ABNORMAL MG/DL
LEUKOCYTE ESTERASE UR QL STRIP: NEGATIVE
NITRATE UR QL: NEGATIVE
PH UR STRIP: 7.5 PH (ref 5–7)
RBC #/AREA URNS AUTO: ABNORMAL /HPF
SOURCE: ABNORMAL
SP GR UR STRIP: 1.01 (ref 1–1.03)
UROBILINOGEN UR STRIP-ACNC: 0.2 EU/DL (ref 0.2–1)
WBC #/AREA URNS AUTO: ABNORMAL /HPF

## 2019-08-08 PROCEDURE — 99213 OFFICE O/P EST LOW 20 MIN: CPT | Performed by: FAMILY MEDICINE

## 2019-08-08 PROCEDURE — 81001 URINALYSIS AUTO W/SCOPE: CPT | Performed by: FAMILY MEDICINE

## 2019-08-08 RX ORDER — IBUPROFEN 200 MG
200 TABLET ORAL EVERY 4 HOURS PRN
COMMUNITY
End: 2022-08-17

## 2019-08-08 ASSESSMENT — MIFFLIN-ST. JEOR: SCORE: 769.01

## 2019-08-08 ASSESSMENT — PAIN SCALES - GENERAL: PAINLEVEL: NO PAIN (0)

## 2019-08-08 NOTE — PROGRESS NOTES
SUBJECTIVE:  Chief Complaint   Patient presents with     Urgent Care     Dysuria     Feels run down, frequent urination x last pm       Shira Salas is a 89 year old female who  presents today for a possible UTI. Symptoms of frequency and some fatigue, mild light-headedness have been going on for 1day(s).  Hematuria no.  gradual onset, still present and improved and mild.  There is no history of fever, chills, nausea or vomiting.  No history of vaginal  discharge. This patient does   have a history of urinary tract infections.  She has chronic urinary incontinence, wears a pad, and she tries to drink little water to avoid producing much urine.  She was walking yesterday in the hot sun. Patient denies long duration, rigors, flank pain, temperature > 101 degrees F. and Vomiting, significant nausea or diarrhea or vaginal discharge, vaginal odor and vaginal itching     Past Medical History:   Diagnosis Date     Glaucoma      Macular degeneration      Patient Active Problem List   Diagnosis     Primary open angle glaucoma     Pxg (pseudoexfoliation glaucoma), mild stage     Pseudophakia of both eyes     Pxg (pseudoexfoliation glaucoma), moderate stage       ALLERGIES:  Sulfa drugs      Current Outpatient Medications on File Prior to Visit:  calcium carbonate (OS-APOLONIA 500 MG [place]. CA) 500 MG tablet Take 500 mg by mouth 2 times daily   Cholecalciferol (VITAMIN D3 PO) Take by mouth daily   Flaxseed, Linseed, (FLAX SEEDS PO) Take 1 capsule by mouth daily   ibuprofen (ADVIL/MOTRIN) 200 MG tablet Take 200 mg by mouth every 4 hours as needed for mild pain   Multiple Vitamins-Minerals (MULTIVITAMIN PO) Take 1 tablet by mouth daily   multivitamin, therapeutic with minerals (MULTI-VITAMIN) TABS Take 1 tablet by mouth daily AREDS FOCUS   Probiotic Product (PROBIOTIC DAILY PO) Take 1 tablet by mouth daily   cefdinir (OMNICEF) 300 MG capsule Take 1 capsule (300 mg) by mouth 2 times daily (Patient not taking: Reported on 8/8/2019)  "  [] cefuroxime (CEFTIN) 250 MG tablet Take 1 tablet (250 mg) by mouth 2 times daily for 5 days     No current facility-administered medications on file prior to visit.     Social History     Tobacco Use     Smoking status: Never Smoker     Smokeless tobacco: Never Used   Substance Use Topics     Alcohol use: Not on file       Family History   Problem Relation Age of Onset     Glaucoma Brother      Glaucoma Sister      Macular Degeneration Mother      Retinal detachment No family hx of      Amblyopia No family hx of        ROS:   CONSTITUTIONAL:NEGATIVE for fever, chills, change in weight  INTEGUMENTARY/SKIN: NEGATIVE for worrisome rashes, moles or lesions  EYES: NEGATIVE for vision changes or irritation  ENT/MOUTH: NEGATIVE for ear, mouth and throat problems  RESP:NEGATIVE for significant cough or SOB    OBJECTIVE:  /80 (BP Location: Left arm, Patient Position: Sitting, Cuff Size: Adult Regular)   Pulse 72   Temp 97.6  F (36.4  C) (Tympanic)   Resp 16   Ht 1.461 m (4' 9.5\")   Wt 46.7 kg (103 lb)   LMP  (LMP Unknown)   SpO2 98%   Breastfeeding? No   BMI 21.90 kg/m    GENERAL APPEARANCE: alert, mild distress and cooperative  EYES: Eyes grossly normal to inspection, PERRL and conjunctivae and sclerae normal  HENT: TM's normal bilaterally and nose and mouth without erythema, ulcers or lesions  NECK: no adenopathy and normal ROM  RESP: lungs clear to auscultation - no rales, rhonchi or wheezes  CV: regular rates and rhythm, normal S1 S2, no murmur noted  ABDOMEN:  soft, nontender, no HSM or masses and bowel sounds normal  BACK: No CVA tenderness  SKIN: no suspicious lesions or rashes  NEURO: Normal strength and tone, sensory exam grossly normal,  normal speech and mentation.   Walks on toes/ heels/ tandem walk without difficulty    Results for orders placed or performed in visit on 19   UA with Microscopic reflex to Culture   Result Value Ref Range    Color Urine Yellow     Appearance Urine " Clear     Glucose Urine Negative NEG^Negative mg/dL    Bilirubin Urine Negative NEG^Negative    Ketones Urine Trace (A) NEG^Negative mg/dL    Specific Gravity Urine 1.015 1.003 - 1.035    pH Urine 7.5 (H) 5.0 - 7.0 pH    Protein Albumin Urine Negative NEG^Negative mg/dL    Urobilinogen Urine 0.2 0.2 - 1.0 EU/dL    Nitrite Urine Negative NEG^Negative    Blood Urine Negative NEG^Negative    Leukocyte Esterase Urine Negative NEG^Negative    Source Midstream Urine     WBC Urine 0 - 5 OTO5^0 - 5 /HPF    RBC Urine O - 2 OTO2^O - 2 /HPF         ASSESSMENT:   Dysuria     - UA with Microscopic reflex to Culture    Reassured no signs of UTI    Dehydration     Drink plenty of fluids.   Especially when exposed to sun or hot weather  Rest for today

## 2019-08-08 NOTE — PATIENT INSTRUCTIONS
Patient Education     Dehydration (Adult)  Dehydration occurs when your body loses too much fluid. This may be the result of prolonged vomiting or diarrhea, excessive sweating, or a high fever. It may also happen if you don t drink enough fluid when you re sick or out in the heat. Misuse of diuretics (water pills) can also be a cause.  Symptoms include thirst and decreased urine output. You may also feel dizzy, weak, fatigued, or very drowsy. The diet described below is usually enough to treat dehydration. In some cases, you may need medicine.  Home care    Drink at least 12 8-ounce glasses of fluid every day to resolve the dehydration. Fluid may include water; orange juice; lemonade; apple, grape, or cranberry juice; clear fruit drinks; electrolyte replacement and sports drinks; and teas and coffee without caffeine. Don't drink alcohol. If you have been diagnosed with a kidney disease, ask your doctor how much and what types of fluids you should drink to prevent dehydration. If you have kidney disease, fluid can build up in the body. This can be dangerous to your health.    If you have a fever, muscle aches, or a headache as a result of a cold or flu, you may take acetaminophen or ibuprofen, unless another medicine was prescribed. If you have chronic liver or kidney disease, or have ever had a stomach ulcer or gastrointestinal bleeding, talk with your healthcare provider before using these medicines. Don't take aspirin if you are younger than 18 and have a fever. Aspirin raises the chance for severe liver injury.  Follow-up care  Follow up with your healthcare provider, or as advised.  When to seek medical advice  Call your healthcare provider right away if any of these occur:    Continued vomiting    Frequent diarrhea (more than 5 times a day); blood (red or black color) or mucus in diarrhea    Blood in vomit or stool    Swollen abdomen or increasing abdominal pain    Weakness, dizziness, or fainting    Unusual  drowsiness or confusion    Reduced urine output or extreme thirst    Fever of 100.4 F (38 C) or higher  Date Last Reviewed: 5/1/2017 2000-2018 [employer]. [address] [place] [address], [place] [address]. All rights reserved. This information is not intended as a substitute for professional medical care. Always follow your healthcare professional's instructions.

## 2019-11-14 ENCOUNTER — OFFICE VISIT (OUTPATIENT)
Dept: OPHTHALMOLOGY | Facility: CLINIC | Age: 84
End: 2019-11-14
Attending: OPHTHALMOLOGY
Payer: COMMERCIAL

## 2019-11-14 DIAGNOSIS — Z96.1 PSEUDOPHAKIA OF BOTH EYES: ICD-10-CM

## 2019-11-14 DIAGNOSIS — H40.1492 PXG (PSEUDOEXFOLIATION GLAUCOMA), MODERATE STAGE: Primary | ICD-10-CM

## 2019-11-14 PROCEDURE — G0463 HOSPITAL OUTPT CLINIC VISIT: HCPCS | Mod: ZF

## 2019-11-14 PROCEDURE — 92083 EXTENDED VISUAL FIELD XM: CPT | Mod: ZF | Performed by: OPHTHALMOLOGY

## 2019-11-14 ASSESSMENT — SLIT LAMP EXAM - LIDS
COMMENTS: NORMAL
COMMENTS: NORMAL

## 2019-11-14 ASSESSMENT — VISUAL ACUITY
OS_CC+: -1
OD_CC: 20/25
METHOD_MR: PT REQUESTS REFRACTION
OS_CC: 20/30
OD_CC+: +
CORRECTION_TYPE: GLASSES
METHOD: SNELLEN - LINEAR

## 2019-11-14 ASSESSMENT — REFRACTION_WEARINGRX
OS_ADD: +3.00
OS_AXIS: 165
OD_AXIS: 180
OS_CYLINDER: +1.00
OS_SPHERE: -0.75
OD_ADD: +3.00
OD_SPHERE: PLANO
OD_CYLINDER: +2.00

## 2019-11-14 ASSESSMENT — CONF VISUAL FIELD
OD_NORMAL: 1
OS_NORMAL: 1
METHOD: COUNTING FINGERS

## 2019-11-14 ASSESSMENT — TONOMETRY
IOP_METHOD: APPLANATION
OD_IOP_MMHG: 20
OS_IOP_MMHG: 13

## 2019-11-14 ASSESSMENT — EXTERNAL EXAM - LEFT EYE: OS_EXAM: NORMAL

## 2019-11-14 ASSESSMENT — EXTERNAL EXAM - RIGHT EYE: OD_EXAM: NORMAL

## 2019-11-14 NOTE — PATIENT INSTRUCTIONS
Patient will return to clinic in 6-8 months with visual field test (LEFT EYE ONLY), dilated eye exam and disc photos.       Patient will start warm compresses 2x/day, increase dietary flax seed/fish oil dietary supplementation, and start artificial tears 4-6x/day as needed for burning, tearing, mattering, foreign body sensation, blurred vision, etc.  Artifical tear drops are available over the counter. Below are a list of the some of the drops available:  Refresh   Systane  Theratears  Genteal  Blink  Optive      Please avoid Visine (unless Visine Puretears), Murine or Cleareyes or Puralube tear drops.    These have ingredients that take the red out and but actually increase dryness and redness of the eyes over time

## 2019-11-14 NOTE — NURSING NOTE
Chief Complaints and History of Present Illnesses   Patient presents with     Follow Up     Pxg (pseudoexfoliation glaucoma)     Chief Complaint(s) and History of Present Illness(es)     Follow Up     Laterality: both eyes    Associated symptoms: dryness.  Negative for eye pain, redness and tearing    Pain scale: 0/10    Comments: Pxg (pseudoexfoliation glaucoma)              Comments     She states that her vision has seemed decreased in her right eye since her last eye exam.  Both eyes seem intermittently dry.  Using artificial tears does resolve the discomfort.  She is not using any glaucoma medications at this time.    Nicole Salas, COT 2:02 PM  November 14, 2019

## 2019-11-14 NOTE — PROGRESS NOTES
1)PXG -- s/p Ahmed Valve OS (2/15/16), s/p SLT OS x4 (4/07, 6/11, 7/14 and 12/28/15), first told about glaucoma in early 2000s, H/O PXE per old notes -- K pachy: 597/611 Tmax:23/41 (OS:49 POD#1 after CE, often [id number] from 4294-2727) HVF: OD:Full and OS:SN dec sens and HVF:Full on 7/14 CDR: 0.2/0.4 HRT/OCT: OD:RNFL WNL and OS:tr RNFL thinning FHX of Glc: sister/brother -- gtts, did not lose vision Gonio:open Intolerant to: Asthma/COPD:No Steroid Use: No Kidney Stones: No Sulfa Allergy:Yes, hives many years ago IOP targets:L-M20s -- IOP good  2)PCIOL OU   3)DENZEL    Patient will return to clinic in 6-8 months with visual field test (LEFT EYE ONLY), dilated eye exam and disc photos.       Patient will start warm compresses 2x/day, increase dietary flax seed/fish oil dietary supplementation, and start artificial tears 4-6x/day as needed for burning, tearing, mattering, foreign body sensation, blurred vision, etc.  Artifical tear drops are available over the counter. Below are a list of the some of the drops available:  Refresh   Systane  Theratears  Genteal  Blink  Optive      Please avoid Visine (unless Visine Puretears), Murine or Cleareyes or Puralube tear drops.    These have ingredients that take the red out and but actually increase dryness and redness of the eyes over time      Attending Physician Attestation:  Complete documentation of historical and exam elements from today's encounter can be found in the full encounter summary report (not reduplicated in this progress note). I personally obtained the chief complaint(s) and history of present illness.  I confirmed and edited as necessary the review of systems, past medical/surgical history, family history, social history, and examination findings as documented by others; and I examined the patient myself. I personally reviewed the relevant tests, images, and reports as documented above. I formulated and edited as necessary the assessment and plan and discussed  the findings and management plan with the patient and family.  - Jennifer Bajwa MD

## 2022-07-05 ENCOUNTER — OFFICE VISIT (OUTPATIENT)
Dept: URGENT CARE | Facility: URGENT CARE | Age: 87
End: 2022-07-05
Payer: COMMERCIAL

## 2022-07-05 VITALS
OXYGEN SATURATION: 98 % | RESPIRATION RATE: 14 BRPM | HEART RATE: 76 BPM | SYSTOLIC BLOOD PRESSURE: 119 MMHG | DIASTOLIC BLOOD PRESSURE: 80 MMHG | TEMPERATURE: 98.1 F

## 2022-07-05 DIAGNOSIS — L03.032 PARONYCHIA OF TOE, LEFT: Primary | ICD-10-CM

## 2022-07-05 PROCEDURE — 99213 OFFICE O/P EST LOW 20 MIN: CPT | Performed by: PHYSICIAN ASSISTANT

## 2022-07-05 NOTE — PROGRESS NOTES
Patient presents with:  Toe Pain: Pt is having L big toe pain/infection X 1 week     L03.032) Paronychia of toe, left  (primary encounter diagnosis)  Comment:   Plan: cephALEXin (KEFLEX) 250 MG capsule          See handout    Epsom salt soaks.        SUBJECTIVE:   Shira Salas is a 89 year old female who presents today with painful, red and swollen adjacent to her left great toenail, onset one week ago, worsening.  Tends to develop mild ingrown toenails, but can usually remedy the situation with trimming and bacitracin on her own. Denies any fevers.      Past Medical History:   Diagnosis Date     Glaucoma      Macular degeneration          Current Outpatient Medications   Medication Sig Dispense Refill     Multiple Vitamins-Iron (DAILY-SUKHDEEP/IRON/BETA-CAROTENE) TABS TAKE 1 TABLET BY MOUTH DAILY. (Patient not taking: Reported on 10/19/2020) 30 tablet 7     Social History     Tobacco Use     Smoking status: Never Smoker     Smokeless tobacco: Never Used   Substance Use Topics     Alcohol use: Not on file     Family History   Problem Relation Age of Onset     Diabetes Mother      Diabetes Father          ROS:    10 point ROS of systems including Constitutional, Eyes, Respiratory, Cardiovascular, Gastroenterology, Genitourinary, Integumentary, Muscularskeletal, Psychiatric ,neurological were all negative except for pertinent positives noted in my HPI       OBJECTIVE:  /80   Pulse 76   Temp 98.1  F (36.7  C) (Tympanic)   Resp 14   LMP  (LMP Unknown)   SpO2 98%   Physical Exam:  GENERAL APPEARANCE: healthy, alert and no distress  Extremities: left great toe: erythema and swelling around nail.  No drainage.    NEURO: Normal strength and tone, sensory exam grossly normal,  normal speech and mentation  SKIN: erythema left toe

## 2022-07-05 NOTE — PATIENT INSTRUCTIONS
(L03.032) Paronychia of toe, left  (primary encounter diagnosis)  Comment:   Plan: cephALEXin (KEFLEX) 250 MG capsule          See handout    Epsom salt soaks.

## 2022-08-08 ENCOUNTER — APPOINTMENT (OUTPATIENT)
Dept: GENERAL RADIOLOGY | Facility: CLINIC | Age: 87
End: 2022-08-08
Attending: EMERGENCY MEDICINE
Payer: COMMERCIAL

## 2022-08-08 ENCOUNTER — HOSPITAL ENCOUNTER (EMERGENCY)
Facility: CLINIC | Age: 87
Discharge: HOME | End: 2022-08-08
Attending: EMERGENCY MEDICINE | Admitting: EMERGENCY MEDICINE
Payer: COMMERCIAL

## 2022-08-08 ENCOUNTER — APPOINTMENT (OUTPATIENT)
Dept: CT IMAGING | Facility: CLINIC | Age: 87
End: 2022-08-08
Attending: EMERGENCY MEDICINE
Payer: COMMERCIAL

## 2022-08-08 VITALS
SYSTOLIC BLOOD PRESSURE: 108 MMHG | RESPIRATION RATE: 16 BRPM | TEMPERATURE: 98.4 F | BODY MASS INDEX: 21.9 KG/M2 | OXYGEN SATURATION: 98 % | HEART RATE: 87 BPM | WEIGHT: 103 LBS | DIASTOLIC BLOOD PRESSURE: 91 MMHG

## 2022-08-08 DIAGNOSIS — S20.219A CONTUSION OF STERNUM, INITIAL ENCOUNTER: ICD-10-CM
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
## 2022-08-08 LAB
ATRIAL RATE - MUSE: 81 BPM
DIASTOLIC BLOOD PRESSURE - MUSE: NORMAL MMHG
INTERPRETATION ECG - MUSE: NORMAL
P AXIS - MUSE: 51 DEGREES
PR INTERVAL - MUSE: 178 MS
QRS DURATION - MUSE: 84 MS
QT - MUSE: 368 MS
QTC - MUSE: 427 MS
R AXIS - MUSE: -4 DEGREES
SYSTOLIC BLOOD PRESSURE - MUSE: NORMAL MMHG
T AXIS - MUSE: 32 DEGREES
VENTRICULAR RATE- MUSE: 81 BPM

## 2022-08-08 PROCEDURE — 71250 CT THORAX DX C-: CPT

## 2022-08-08 PROCEDURE — 99285 EMERGENCY DEPT VISIT HI MDM: CPT | Mod: 25

## 2022-08-08 PROCEDURE — 93005 ELECTROCARDIOGRAM TRACING: CPT

## 2022-08-08 PROCEDURE — 71046 X-RAY EXAM CHEST 2 VIEWS: CPT

## 2022-08-08 ASSESSMENT — ENCOUNTER SYMPTOMS
ARTHRALGIAS: 1
MYALGIAS: 1
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
## 2022-08-08 NOTE — ED NOTES
Attempted to call son who is listed as primary contact and only contact, answering machine.  Previous message had been left.  Pt is unable to provide any other contact numbers at this time.

## 2022-08-17 ENCOUNTER — APPOINTMENT (OUTPATIENT)
Dept: CT IMAGING | Facility: CLINIC | Age: 87
End: 2022-08-17
Attending: PHYSICIAN ASSISTANT
Payer: COMMERCIAL

## 2022-08-17 ENCOUNTER — HOSPITAL ENCOUNTER (OUTPATIENT)
Facility: CLINIC | Age: 87
Setting detail: OBSERVATION
Discharge: HOME | End: 2022-08-19
Attending: PHYSICIAN ASSISTANT | Admitting: PHYSICIAN ASSISTANT
Payer: COMMERCIAL

## 2022-08-17 DIAGNOSIS — M54.9 BACK PAIN, UNSPECIFIED BACK LOCATION, UNSPECIFIED BACK PAIN LATERALITY, UNSPECIFIED CHRONICITY: ICD-10-CM

## 2022-08-17 DIAGNOSIS — S32.10XA CLOSED FRACTURE OF SACRUM, UNSPECIFIED PORTION OF SACRUM, INITIAL ENCOUNTER (H): ICD-10-CM

## 2022-08-17 DIAGNOSIS — M25.559 HIP PAIN: ICD-10-CM

## 2022-08-17 PROBLEM — R52 GENERALIZED BODY ACHES: Status: ACTIVE | Noted: 2022-08-17

## 2022-08-17 LAB
ALBUMIN UR-MCNC: NEGATIVE MG/DL
ANION GAP SERPL CALCULATED.3IONS-SCNC: 5 MMOL/L (ref 3–14)
APPEARANCE UR: CLEAR
BASOPHILS # BLD AUTO: 0 10E3/UL (ref 0–0.2)
BASOPHILS NFR BLD AUTO: 0 %
BILIRUB UR QL STRIP: NEGATIVE
BUN SERPL-MCNC: 23 MG/DL (ref 7–30)
CALCIUM SERPL-MCNC: 8.9 MG/DL (ref 8.5–10.1)
CHLORIDE BLD-SCNC: 113 MMOL/L (ref 94–109)
CO2 SERPL-SCNC: 23 MMOL/L (ref 20–32)
COLOR UR AUTO: ABNORMAL
CREAT SERPL-MCNC: 0.68 MG/DL (ref 0.52–1.04)
EOSINOPHIL # BLD AUTO: 0.1 10E3/UL (ref 0–0.7)
EOSINOPHIL NFR BLD AUTO: 2 %
ERYTHROCYTE [DISTWIDTH] IN BLOOD BY AUTOMATED COUNT: 12.4 % (ref 10–15)
GFR SERPL CREATININE-BSD FRML MDRD: 81 ML/MIN/1.73M2
GLUCOSE BLD-MCNC: 98 MG/DL (ref 70–99)
GLUCOSE UR STRIP-MCNC: NEGATIVE MG/DL
HCT VFR BLD AUTO: 37.5 % (ref 35–47)
HGB BLD-MCNC: 12.7 G/DL (ref 11.7–15.7)
HGB UR QL STRIP: NEGATIVE
IMM GRANULOCYTES # BLD: 0.1 10E3/UL
IMM GRANULOCYTES NFR BLD: 1 %
KETONES UR STRIP-MCNC: 20 MG/DL
LEUKOCYTE ESTERASE UR QL STRIP: ABNORMAL
LYMPHOCYTES # BLD AUTO: 1.2 10E3/UL (ref 0.8–5.3)
LYMPHOCYTES NFR BLD AUTO: 12 %
MCH RBC QN AUTO: 30.7 PG (ref 26.5–33)
MCHC RBC AUTO-ENTMCNC: 33.9 G/DL (ref 31.5–36.5)
MCV RBC AUTO: 91 FL (ref 78–100)
MONOCYTES # BLD AUTO: 0.9 10E3/UL (ref 0–1.3)
MONOCYTES NFR BLD AUTO: 9 %
NEUTROPHILS # BLD AUTO: 7.2 10E3/UL (ref 1.6–8.3)
NEUTROPHILS NFR BLD AUTO: 76 %
NITRATE UR QL: NEGATIVE
NRBC # BLD AUTO: 0 10E3/UL
NRBC BLD AUTO-RTO: 0 /100
PH UR STRIP: 7.5 [PH] (ref 5–7)
PLATELET # BLD AUTO: 239 10E3/UL (ref 150–450)
POTASSIUM BLD-SCNC: 3.7 MMOL/L (ref 3.4–5.3)
RBC # BLD AUTO: 4.14 10E6/UL (ref 3.8–5.2)
RBC URINE: 0 /HPF
SARS-COV-2 RNA RESP QL NAA+PROBE: NEGATIVE
SODIUM SERPL-SCNC: 141 MMOL/L (ref 133–144)
SP GR UR STRIP: 1.02 (ref 1–1.03)
UROBILINOGEN UR STRIP-MCNC: NORMAL MG/DL
WBC # BLD AUTO: 9.5 10E3/UL (ref 4–11)
WBC URINE: 2 /HPF

## 2022-08-17 PROCEDURE — 250N000011 HC RX IP 250 OP 636: Performed by: PHYSICIAN ASSISTANT

## 2022-08-17 PROCEDURE — C9803 HOPD COVID-19 SPEC COLLECT: HCPCS

## 2022-08-17 PROCEDURE — 72131 CT LUMBAR SPINE W/O DYE: CPT

## 2022-08-17 PROCEDURE — 96374 THER/PROPH/DIAG INJ IV PUSH: CPT

## 2022-08-17 PROCEDURE — G0378 HOSPITAL OBSERVATION PER HR: HCPCS

## 2022-08-17 PROCEDURE — U0003 INFECTIOUS AGENT DETECTION BY NUCLEIC ACID (DNA OR RNA); SEVERE ACUTE RESPIRATORY SYNDROME CORONAVIRUS 2 (SARS-COV-2) (CORONAVIRUS DISEASE [COVID-19]), AMPLIFIED PROBE TECHNIQUE, MAKING USE OF HIGH THROUGHPUT TECHNOLOGIES AS DESCRIBED BY CMS-2020-01-R: HCPCS | Performed by: PHYSICIAN ASSISTANT

## 2022-08-17 PROCEDURE — 250N000013 HC RX MED GY IP 250 OP 250 PS 637: Performed by: HOSPITALIST

## 2022-08-17 PROCEDURE — 99220 PR INITIAL OBSERVATION CARE,LEVEL III: CPT | Performed by: HOSPITALIST

## 2022-08-17 PROCEDURE — 36415 COLL VENOUS BLD VENIPUNCTURE: CPT | Performed by: PHYSICIAN ASSISTANT

## 2022-08-17 PROCEDURE — 99285 EMERGENCY DEPT VISIT HI MDM: CPT | Mod: 25

## 2022-08-17 PROCEDURE — 85025 COMPLETE CBC W/AUTO DIFF WBC: CPT | Performed by: PHYSICIAN ASSISTANT

## 2022-08-17 PROCEDURE — 80048 BASIC METABOLIC PNL TOTAL CA: CPT | Performed by: PHYSICIAN ASSISTANT

## 2022-08-17 PROCEDURE — 250N000009 HC RX 250: Performed by: PHYSICIAN ASSISTANT

## 2022-08-17 PROCEDURE — 72192 CT PELVIS W/O DYE: CPT

## 2022-08-17 PROCEDURE — 74177 CT ABD & PELVIS W/CONTRAST: CPT

## 2022-08-17 PROCEDURE — 81001 URINALYSIS AUTO W/SCOPE: CPT | Performed by: PHYSICIAN ASSISTANT

## 2022-08-17 RX ORDER — ONDANSETRON 2 MG/ML
4 INJECTION INTRAMUSCULAR; INTRAVENOUS EVERY 6 HOURS PRN
Status: DISCONTINUED | OUTPATIENT
Start: 2022-08-17 | End: 2022-08-19 | Stop reason: HOSPADM

## 2022-08-17 RX ORDER — HYDROMORPHONE HYDROCHLORIDE 1 MG/ML
0.2 INJECTION, SOLUTION INTRAMUSCULAR; INTRAVENOUS; SUBCUTANEOUS ONCE
Status: COMPLETED | OUTPATIENT
Start: 2022-08-17 | End: 2022-08-17

## 2022-08-17 RX ORDER — BISACODYL 10 MG
10 SUPPOSITORY, RECTAL RECTAL DAILY PRN
Status: DISCONTINUED | OUTPATIENT
Start: 2022-08-17 | End: 2022-08-19 | Stop reason: HOSPADM

## 2022-08-17 RX ORDER — AMOXICILLIN 250 MG
2 CAPSULE ORAL 2 TIMES DAILY
Status: DISCONTINUED | OUTPATIENT
Start: 2022-08-17 | End: 2022-08-19 | Stop reason: HOSPADM

## 2022-08-17 RX ORDER — NALOXONE HYDROCHLORIDE 0.4 MG/ML
0.4 INJECTION, SOLUTION INTRAMUSCULAR; INTRAVENOUS; SUBCUTANEOUS
Status: DISCONTINUED | OUTPATIENT
Start: 2022-08-17 | End: 2022-08-19 | Stop reason: HOSPADM

## 2022-08-17 RX ORDER — IOPAMIDOL 755 MG/ML
52 INJECTION, SOLUTION INTRAVASCULAR ONCE
Status: COMPLETED | OUTPATIENT
Start: 2022-08-17 | End: 2022-08-17

## 2022-08-17 RX ORDER — AMOXICILLIN 250 MG
1 CAPSULE ORAL 2 TIMES DAILY
Status: DISCONTINUED | OUTPATIENT
Start: 2022-08-17 | End: 2022-08-19 | Stop reason: HOSPADM

## 2022-08-17 RX ORDER — METHOCARBAMOL 500 MG/1
500 TABLET, FILM COATED ORAL 3 TIMES DAILY
Status: COMPLETED | OUTPATIENT
Start: 2022-08-17 | End: 2022-08-18

## 2022-08-17 RX ORDER — ONDANSETRON 4 MG/1
4 TABLET, ORALLY DISINTEGRATING ORAL EVERY 6 HOURS PRN
Status: DISCONTINUED | OUTPATIENT
Start: 2022-08-17 | End: 2022-08-19 | Stop reason: HOSPADM

## 2022-08-17 RX ORDER — NALOXONE HYDROCHLORIDE 0.4 MG/ML
0.2 INJECTION, SOLUTION INTRAMUSCULAR; INTRAVENOUS; SUBCUTANEOUS
Status: DISCONTINUED | OUTPATIENT
Start: 2022-08-17 | End: 2022-08-19 | Stop reason: HOSPADM

## 2022-08-17 RX ORDER — HYDROMORPHONE HYDROCHLORIDE 1 MG/ML
0.5 INJECTION, SOLUTION INTRAMUSCULAR; INTRAVENOUS; SUBCUTANEOUS ONCE
Status: DISCONTINUED | OUTPATIENT
Start: 2022-08-17 | End: 2022-08-17

## 2022-08-17 RX ORDER — HYDRALAZINE HYDROCHLORIDE 20 MG/ML
10 INJECTION INTRAMUSCULAR; INTRAVENOUS EVERY 4 HOURS PRN
Status: DISCONTINUED | OUTPATIENT
Start: 2022-08-17 | End: 2022-08-19 | Stop reason: HOSPADM

## 2022-08-17 RX ORDER — ACETAMINOPHEN 325 MG/1
975 TABLET ORAL EVERY 8 HOURS
Status: DISCONTINUED | OUTPATIENT
Start: 2022-08-17 | End: 2022-08-19 | Stop reason: HOSPADM

## 2022-08-17 RX ADMIN — OXYCODONE HYDROCHLORIDE 2.5 MG: 5 TABLET ORAL at 17:51

## 2022-08-17 RX ADMIN — HYDROMORPHONE HYDROCHLORIDE 0.2 MG: 1 INJECTION, SOLUTION INTRAMUSCULAR; INTRAVENOUS; SUBCUTANEOUS at 13:40

## 2022-08-17 RX ADMIN — METHOCARBAMOL 500 MG: 500 TABLET ORAL at 19:52

## 2022-08-17 RX ADMIN — IOPAMIDOL 52 ML: 755 INJECTION, SOLUTION INTRAVENOUS at 14:40

## 2022-08-17 RX ADMIN — SODIUM CHLORIDE 60 ML: 900 INJECTION INTRAVENOUS at 14:40

## 2022-08-17 RX ADMIN — SENNOSIDES AND DOCUSATE SODIUM 1 TABLET: 50; 8.6 TABLET ORAL at 19:52

## 2022-08-17 RX ADMIN — ACETAMINOPHEN 975 MG: 325 TABLET, FILM COATED ORAL at 22:05

## 2022-08-17 ASSESSMENT — ACTIVITIES OF DAILY LIVING (ADL)
ADLS_ACUITY_SCORE: 35
ADLS_ACUITY_SCORE: 30
ADLS_ACUITY_SCORE: 35
ADLS_ACUITY_SCORE: 30
ADLS_ACUITY_SCORE: 35
ADLS_ACUITY_SCORE: 30

## 2022-08-17 ASSESSMENT — ENCOUNTER SYMPTOMS
ABDOMINAL PAIN: 1
BACK PAIN: 1
HEADACHES: 0
ARTHRALGIAS: 1
NECK PAIN: 0
SHORTNESS OF BREATH: 0

## 2022-08-17 NOTE — PLAN OF CARE
Goal Outcome Evaluation:    Plan of Care Reviewed With: patient, son     Orientation/Cognitive: A&Ox4  Observation Goals (Met/ Not Met): Not met  Mobility Level/Assist Equipment: Not oob this shift  Fall Risk (Y/N): yes  Behavior Concerns: no  Pain Management: prn oxycodone given x1  Tele/VS/O2: VSS on RA ex htn  ABNL Lab/BG:   Diet: Regular  Bowel/Bladder: Purewick in place  Skin Concerns: Scattered bruising throughout body  Drains/Devices: PIV SL   Tests/Procedures for next shift:   Anticipated DC date & active delays: PT, CC consult  Patient Stated Goal for Today:     Observation goals  PRIOR TO DISCHARGE        -diagnostic tests and consults completed and resulted: not met   -vital signs normal or at patient baseline: not met   -adequate pain control on oral analgesics: partially met   -returns to baseline functional status: not met   -safe disposition plan has been identified: not met     Nurse to notify provider when observation goals have been met and patient is ready for discharge.
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
## 2022-08-17 NOTE — ED NOTES
"Patient states that she has no pain because she doesn't move. When asked what the pain is when she moves it, patient replies \"I just don't move it.\" Explained that provider ordered pain medication to help with pain and thus to help patient move. Patient continues to say that she is fine if she just doesn't move and isn't forthcoming with her level of pain.    Patient soiled the bed. Moaned out in pain as tech and RN changed sheets and started a Purewick.   "
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
## 2022-08-17 NOTE — PROGRESS NOTES
RECEIVING UNIT ED HANDOFF REVIEW    ED Nurse Handoff Report was reviewed by: Marietta Fernandez RN on August 17, 2022 at 4:55 PM
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
## 2022-08-17 NOTE — ED NOTES
Brought patient water as requested and patients daughter is frustrated that we don't have a plan for the patient yet. Advised daughter that she should plan on being here for at least 4 hours and we should have a plan by then.
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
## 2022-08-17 NOTE — PHARMACY-ADMISSION MEDICATION HISTORY
Pharmacy Medication History  Admission medication history interview status for the 8/17/2022  admission is complete. See EPIC admission navigator for prior to admission medications     Location of Interview: Patient room  Medication history sources: Patient and Patient's family/friend (daughter)    Significant changes made to the medication list:  Patient states not taking any prescription medications.    In the past week, patient estimated taking medication this percent of the time: greater than 90%    Medication reconciliation completed by provider prior to medication history? No    Time spent in this activity: 8 min    Prior to Admission medications    Medication Sig Last Dose Taking? Auth Provider Long Term End Date   Cholecalciferol (VITAMIN D3 PO) Take 1,000 Units by mouth daily 8/17/2022 at Unknown time Yes Reported, Patient     Flaxseed, Linseed, (FLAX SEEDS PO) Take 1 capsule by mouth daily 8/17/2022 at Unknown time Yes Reported, Patient     multivitamin w/minerals (THERA-VIT-M) tablet Take 1 tablet by mouth daily AREDS FOCUS 8/17/2022 at Unknown time Yes Reported, Patient     Probiotic Product (PROBIOTIC DAILY PO) Take 1 tablet by mouth daily 8/17/2022 at Unknown time Yes Reported, Patient         The information provided in this note is only as accurate as the sources available at the time of update(s)
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
## 2022-08-18 ENCOUNTER — APPOINTMENT (OUTPATIENT)
Dept: PHYSICAL THERAPY | Facility: CLINIC | Age: 87
End: 2022-08-18
Attending: HOSPITALIST
Payer: COMMERCIAL

## 2022-08-18 PROCEDURE — 250N000013 HC RX MED GY IP 250 OP 250 PS 637: Performed by: HOSPITALIST

## 2022-08-18 PROCEDURE — 97161 PT EVAL LOW COMPLEX 20 MIN: CPT | Mod: GP | Performed by: PHYSICAL THERAPIST

## 2022-08-18 PROCEDURE — 97530 THERAPEUTIC ACTIVITIES: CPT | Mod: GP | Performed by: PHYSICAL THERAPIST

## 2022-08-18 PROCEDURE — G0378 HOSPITAL OBSERVATION PER HR: HCPCS

## 2022-08-18 PROCEDURE — 99225 PR SUBSEQUENT OBSERVATION CARE,LEVEL II: CPT | Performed by: INTERNAL MEDICINE

## 2022-08-18 PROCEDURE — 97116 GAIT TRAINING THERAPY: CPT | Mod: GP | Performed by: PHYSICAL THERAPIST

## 2022-08-18 RX ADMIN — ACETAMINOPHEN 975 MG: 325 TABLET, FILM COATED ORAL at 14:45

## 2022-08-18 RX ADMIN — METHOCARBAMOL 500 MG: 500 TABLET ORAL at 09:13

## 2022-08-18 RX ADMIN — ACETAMINOPHEN 975 MG: 325 TABLET, FILM COATED ORAL at 06:49

## 2022-08-18 RX ADMIN — SENNOSIDES AND DOCUSATE SODIUM 1 TABLET: 50; 8.6 TABLET ORAL at 20:01

## 2022-08-18 RX ADMIN — METHOCARBAMOL 500 MG: 500 TABLET ORAL at 14:46

## 2022-08-18 RX ADMIN — ACETAMINOPHEN 975 MG: 325 TABLET, FILM COATED ORAL at 22:10

## 2022-08-18 RX ADMIN — OXYCODONE HYDROCHLORIDE 2.5 MG: 5 TABLET ORAL at 20:01

## 2022-08-18 ASSESSMENT — ACTIVITIES OF DAILY LIVING (ADL)
ADLS_ACUITY_SCORE: 39
ADLS_ACUITY_SCORE: 30
ADLS_ACUITY_SCORE: 37
ADLS_ACUITY_SCORE: 39
ADLS_ACUITY_SCORE: 30
ADLS_ACUITY_SCORE: 39
ADLS_ACUITY_SCORE: 30
ADLS_ACUITY_SCORE: 39

## 2022-08-18 NOTE — PROGRESS NOTES
Care Management Follow Up    Length of Stay (days): 0    Expected Discharge Date: 08/19/2022     Concerns to be Addressed:       Patient plan of care discussed at interdisciplinary rounds: Yes    Anticipated Discharge Disposition: Transitional Care     Anticipated Discharge Services: None  Anticipated Discharge DME: None    Patient/family educated on Medicare website which has current facility and service quality ratings: yes  Education Provided on the Discharge Plan:    Patient/Family in Agreement with the Plan: yes    Referrals Placed by CM/SW:    Private pay costs discussed: Not applicable    Additional Information:  Writer called [employer] and started the case (Case ID- [id number]). Writer talked to a RN and gave patient's clinical information. She said that patient will get the results in the next hour by fax.    Addendum: Writer called [facility] and she said the case was approved for tcu stay at [facility] 8/19-8/25. The authorization number is [id number].  Writer called [facility] and let her know that we received [employer] auth. She said that they will try to get patient in tomorrow and to call in the morning for time.  Writer let patient know of this.    Will continue to follow.    BALTAZAR Goodwin
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
## 2022-08-18 NOTE — PROGRESS NOTES
NEUROLOGY HOSPITAL FOLLOW UP [facility] NOTE:                             Xavi Vieyra     Referring provider: Dr. Malvin Price MD  Date of evaluation: 7/27/2022  YOB: 1936    Chief Complaint   Patient presents with   • Follow-up     Patient is accompanied by Daughter.    HISTORY OF PRESENT ILLNESS:   85 year old male with past medical history of CAD s/p CABG, s/p CEA,  Atrial fibrillation (on eliquis), left neck mass (work up in progress), hypertension, hyperlipidemia presented to [facility] on 6/21/22 with new onset of spinning, nausea, vomiting, diplopia  found to have acute posterior circulation infarcts despite AC and AP. Has history of R ICA stenosis s/p stent, patent on angio. Severe cervical L ICA stenosis s/p angioplasty/stenting on 6/24/22. 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 Antiplatelet and anticoagulation plan per neurosurgery, and patient was advised to avoid triple therapy. He was also advised to continue atorvastatin 80 mg daily. Presents for stroke follow up.       Interval History 7/27/22:  -Off plavix the last two days. Was told to stop eliquis tomorrow by IR staff that schedules OP procedures.   -no more dizziness, nausea, or diplopia. Now home alone, living independently. PT came with  services and signed off.   -Blood pressure- doesn't check at home, but will start. Daughter is getting him BP cuff.  On metoprolol 50 nightly per PCP.   -Tobacco use in the past, quit 60 years ago. Cigars 40 years ago. etoh use, not in the last 40 years .  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 He has plans for biopsy of his neck next week.   -He has been on 75 mg plavix and Eliquis 5 mg BID without adverse effects.   -he was advised for biopsy stop plavix 7/26, stop eliquis 7/28. Procedure 7/29.   -Scheduled for biopsy on  Orientation/Cognitive: A&O  Observation Goals (Met/ Not Met): not met  Mobility Level/Assist Equipment: bedrest  Fall Risk (Y/N): y  Behavior Concerns: n  Pain Management: scheduled tylenol, declines pain meds  Tele/VS/O2: WNL  Diet: reg  Bowel/Bladder: purwick  Skin Concerns: scattered bruising  Drains/Devices: purwick  Tests/Procedures for next shift: PT/SW  Anticipated DC date & active delays: TBD  Patient Stated Goal for Today: get rest   8/2/22. NSGY checking with IR regarding safety of antiplatelets. Plan to bridge with PCP, RN will call and discuss with PCP    -He denies any new headaches, vision changes,  numbness, tingling, or weakness.   __________________________________________________________  Last note per  Dr Wadsworth at [facility] on 6/24/22:  85 y.o. M w/ hx CAD s/p CABG (on ASA 81), Afib (on apixanan 5 bid), R carotid stenosis s/p stent in 2021 (not CEA), squamous cell ca of neck p/w dizziness, nausea, vomiting, gait difficulty          Imaging personally reviewed     MRI brain w wo - acute left > right cerebellar infarcts    MRA head/neck - s/p stenting of R ICA (visualization limited by artifact); 60-70% cervical L ICA stenosis; VA stenoses with hypoplastic VAs and basilar    Cerebral angio - R ICA stent patent; 70-80% stenosis of LICA   TTE - LVEF 57% w/ hypokinesis; normal LA          1) Acute posterior circulation infarcts despite AC and AP.   Hx of R ICA stenosis s/p stent, patent on angio   Severe cervical L ICA stenosis s/p angioplasty/stenting on 6/24   VA atherosclerosis with hypoplastic basilar artery       -antiplatelet and anticoagulation plan per neurosurgery; limiting triple therapy as possible   -atorvastatin 80    -goal SBP < 160         PAST MEDICAL, SURGICAL, FAMILY AND SOCIAL HISTORY:  Patient Active Problem List   Diagnosis   • Other chest pain   • Carotid stenosis, asymptomatic, bilateral   • Mass in neck   • CAD (coronary artery disease), native coronary artery   • Hyperlipidemia, unspecified   • Essential hypertension   • Paroxysmal atrial fibrillation (CMS/HCC)   • S/P CABG (coronary artery bypass graft)   • Acute CVA (cerebrovascular accident) (CMS/HCC)   • Urinary retention   • Anemia   • Malignant neoplasm of tonsillar fossa (CMS/HCC)     Past Medical History:   Diagnosis Date   • Carotid stenosis    • Coronary artery disease    • Eczema    • Essential (primary) hypertension    • High cholesterol    •  Hyperlipoproteinemia      Past Surgical History:   Procedure Laterality Date   • Appendectomy     • Carotid stent Right 07/07/2021   • Cataract extraction, bilateral     • Coronary artery bypass graft  07/22/2021    3 vessel Dr. Fuller   • Eye surgery     • Hernia repair       Family History   Family history unknown: Yes     Social History     Tobacco Use   Smoking Status Former Smoker   • Packs/day: 0.50   • Years: 3.00   • Pack years: 1.50   • Types: Cigars   Smokeless Tobacco Never Used   Tobacco Comment    quiyt 60 years ago     Patient Active Problem List     Substance and Sexual Activity   Drug Use Not Currently     Social History     Substance and Sexual Activity   Alcohol Use Not Currently       ALLERGIES:  ALLERGIES:  No Known Allergies      MEDICATIONS:    Current Outpatient Medications   Medication Sig Dispense Refill   • clopidogrel (PLAVIX) 75 MG tablet Take 1 tablet by mouth daily. 30 tablet 1   • docusate sodium-sennosides (SENOKOT S) 50-8.6 MG per tablet Take 1 tablet by mouth nightly.     • aspirin 325 MG tablet Take 1 tablet by mouth daily for 4 days. 4 tablet 0   • apixaBAN (ELIQUIS) 5 MG Tab Take 1 tablet by mouth every 12 hours. Do not start before July 2, 2022. 180 tablet 3   • metoPROLOL succinate (TOPROL-XL) 25 MG 24 hr tablet Take 1 tablet by mouth daily. 90 tablet 3   • atorvastatin (LIPITOR) 80 MG tablet Take 1 tablet by mouth nightly. 90 tablet 3     No current facility-administered medications for this visit.       REVIEW OF SYSTEMS:  (except as listed above in HPI)  General:  No fevers, chills, excessive fatigue, loss of appetite or unexpected weight gain  Eyes:  No eye pain, vision change or vision loss   ENT: No hearing loss, ear pain, hoarse throat/voice or nasal congestion  Cardiovascular: No chest pain, palpitations/irregular heart beat, lightheadedness or edema in LEs  Respiratory: No shortness of breath, wheezing, snoring or coughing up blood  Gastrointestinal:  No blood in  stools, abdominal pain or unexplained nausea/vomiting  Genitourinary: No blood in urine, pain with urination or inability to control urine  Endocrine:  No heat/cold intolerance, excessive thirst or appetite  Skin: no new rashes or change in mole  Hematologic:  No easy bruising, bleeding or on 'blood thinners'  Musculoskeletal: No joint pain, joint swelling or muscle aches  Neurologic: per HPI  Psychiatric:  No anxiety, depression, insomnia or suicidal thoughts      PHYSICAL EXAM:  Vitals: Blood pressure 124/63, pulse 71, temperature 97.3 °F (36.3 °C), temperature source Temporal, height 5' 7\" (1.702 m), weight 70.8 kg (156 lb).  Gen: No acute distress.   Head:  Normocephalic    ENT:  Normal pharynx  Neck:  Supple  Heart:  Normal rate  Extremities:No deformities.  Skin: No obvious rashes noted on body  Musculoskeletal:  Good ROM in limbs  Psych:  Affect was appropriate to situation and mood was normal.  Neurologic:  Mental status:  Awake and alert.   Oriented to person and place.  No aphasia or neglect.   Cranial nerves:   Pupils are equal and briskly reactive to light.  Extraocular muscles are intact. Normal visual fields to confrontation.  Facial sensation to light touch is symmetric V1-V3 bilaterally. Mild left facial droop.  Hard of hearing.  Soft palate elevates equally. Tongue is midline.  Shoulder shrug is symmetric.  Motor exam:  Strength in all 4 extremities is 5/5.   Sensory exam:  Normal sensation to light touch in all 4 extremities.  Cerebellar exam:  FNF with mild dysmetria, L>R.  HTS are intact.   Gait:  Gait steady, unassisted       LABS/TESTING/RESULTS/RECORDS REVIEWED:  Cholesterol (mg/dL)   Date Value   06/21/2022 101     HDL (mg/dL)   Date Value   06/21/2022 45     Cholesterol/ HDL Ratio (no units)   Date Value   06/21/2022 2.2     Triglycerides (mg/dL)   Date Value   06/21/2022 73     LDL (mg/dL)   Date Value   06/21/2022 41      Hemoglobin A1C (%)   Date Value   06/21/2022 5.2     WBC (K/mcL)    Date Value   06/27/2022 5.6     RBC (mil/mcL)   Date Value   06/27/2022 2.61 (L)     HCT (%)   Date Value   06/27/2022 24.6 (L)     HGB (g/dL)   Date Value   06/27/2022 8.3 (L)     PLT (K/mcL)   Date Value   06/27/2022 151         Results for orders placed or performed during the hospital encounter of 06/21/22   Electrocardiogram 12-Lead   Result Value Ref Range    Ventricular Rate EKG/Min (BPM) 62     Atrial Rate (BPM) 62     GA-Interval (MSEC) 232     QRS-Interval (MSEC) 109     QT-Interval (MSEC) 455     QTc 462     P Axis (Degrees) 0     R Axis (Degrees) 55     T Axis (Degrees) 64     REPORT TEXT       Sinus rhythm  Prolonged GA interval  Probable left atrial enlargement  Confirmed by JOVANNY ALBERTO, [facility] ([id number]) on 6/21/2022 8:12:32 AM        MRI brain w wo - acute left > right cerebellar infarcts      MRA head/neck - s/p stenting of R ICA (visualization limited by artifact); 60-70% cervical L ICA stenosis; VA stenoses with hypoplastic VAs and basilar      Cerebral angio - R ICA stent patent; 70-80% stenosis of LICA     TTE - LVEF 57% w/ hypokinesis; normal LA      ASSESSMENT:  Problem List Items Addressed This Visit        Neuro    Acute CVA (cerebrovascular accident) (CMS/HCC)     6/21/2022 with vertigo, diplopia and vomiting and work-up showed left cortical cerebellar infarct.  He is on Eliquis aspirin and statin.  Also on Plavix.  He will be transitioning to Eliquis Plavix and statin and off of the aspirin.  He will be getting a biopsy discussing his anticoagulation needs with the other services.  He says he is doing well.  He has finishes home therapy.  He is at home with cooking, cleaning, doing his finances.  He does not notice any deficits.  He is not noticing any cognitive deficits.  His daughter is not noticing any cognitive deficits.  He has been living back on his own again without problems.  He is walking and performing all his usual activities.  He was asking about driving.  He does not  appear to have neglect.  He was able to alternate from topic to topic.  He has good strength, coordination, rapid movements, and he appears to be able to be safely driving.  I would recommend graduated .  He should go into a parking lot with his daughter or a trusted adults and practice.  Prior to going into the street he should work in the parking lot and then he can start driving on side streets in good weather during the daytime and progress from there.  He does seem to have made a excellent recovery and is not having any impairments noted.             Other Visit Diagnoses     Bilateral carotid artery stenosis    -  Primary    Relevant Orders    [facility] CAROTID DUPLEX          CLINICAL SUMMARY:  85 year old male with  history of CAD s/p CABG, s/p CEA,  Atrial fibrillation (on eliquis), left neck mass (work up in progress), hypertension, hyperlipidemia presented to [facility] on 6/21/22 with new onset of spinning, nausea, vomiting, diplopia  found to have acute posterior circulation infarcts despite AC and AP. Has history of R ICA stenosis s/p stent, patent on angio. Severe cervical L ICA stenosis s/p angioplasty/stenting on 6/24/22. 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 Antiplatelet and anticoagulation (afib) plan per neurosurgery, and patient was advised to avoid triple therapy. He was also advised to continue atorvastatin 80 mg daily. Presents for stroke follow up.     7/27/22 visit: Doing well, mild left facial droop. No more dizziness or double vision. He has been cleared from PT.  On Plavix and Eliquis. Planning for neck biopsy next week in IR. Discussed risk of stroke increased in setting of recent stroke, despite being on AC and AP, and advised limiting the amount of days missing plavix and Eliquis as he is planning for biopsy of his neck next week with IR. Upon further discussion with NSGY and IR team, IR team advised to take  aspirin while off of plavix (7 days prior to procedure), and OK to resume plavix day after procedure. He was also advised to hold Eliquis 5 days prior to procedure. Discussed increased risk of stroke if missed doses. Patient and daughter expressed understanding.     PLAN:  -antiplatelet and anticoagulation plan per neurosurgery and IR team pending procedure. ok per Dr Rebolledo to continue aspirin with lovenox bridge prior to procedure. RN to call PCP to discuss need for bridging prior to procedure. Bleeding precautions reviewed.   -continue atorvastatin 80    -BP goal normotension. He will obtain BP cuff for home  -HH diet/lifestyle. Stroke risk factors/education reviewed, as well as when to call 911.  -follow up with Dr Templeton or Dr Peralta in 3 months (extended follow up)      Total time spent during the encounter[35 min]   Previsit- Reviewed prior clinic notes [5min]  Visit- Performed medically appropriate history& physical examination.Surveillance labs & diagnostic studies-requested & reviewed, discussed diagnosis & prognosis, risks and benefits of treatment options, instructions for management, treatment, compliance, and follow up care. Patient and family education is provided.[25 min]  Post visit- Document the encounter [5min]    Mariana Shore NP  [facility]

## 2022-08-18 NOTE — PLAN OF CARE
Goal Outcome Evaluation:   A/OX4, cms intact. Schedule tylenol and Robaxin given for pain. PRN oxycodone. Not OOB yet. Purewick in place with adequate output. PT and SW to see pt tomorrow. Will continue to monitor.

## 2022-08-18 NOTE — PROGRESS NOTES
PRIOR TO DISCHARGE        Comments:   -diagnostic tests and consults completed and resulted- Met  -vital signs normal or at patient baseline -met  -adequate pain control on oral analgesics -met  -returns to baseline functional status -not met  -safe disposition plan has been identified -not met  Nurse to notify provider when observation goals have been met and patient is ready for discharge.

## 2022-08-18 NOTE — PLAN OF CARE
Goal Outcome Evaluation:    Plan of Care Reviewed With: patient     Orientation/Cognitive: AOx4  Observation Goals (Met/ Not Met): Not met  Mobility Level/Assist Equipment: A1 GB/W  Fall Risk (Y/N): Y  Behavior Concerns: Green  Pain Management: Scheduled Robaxin and Tylenol   Tele/VS/O2: VSS on RA  ABNL Lab/BG: None  Diet: Regular  Bowel/Bladder: Continent  Skin Concerns: Bruise to L upper thigh and L shin  Drains/Devices: PIV SL  Tests/Procedures for next shift: None  Anticipated DC date & active delays: 8/19 to [facility], daughter to provide transportation  Patient Stated Goal for Today: Get some rest

## 2022-08-18 NOTE — PROGRESS NOTES
PRIOR TO DISCHARGE        Comments:   -diagnostic tests and consults completed and resulted- Met  -vital signs normal or at patient baseline -met  -adequate pain control on oral analgesics -met  -returns to baseline functional status -not met  -safe disposition plan has been identified -met  Nurse to notify provider when observation goals have been met and patient is ready for discharge.

## 2022-08-18 NOTE — PROGRESS NOTES
PRIOR TO DISCHARGE        Comments:   -diagnostic tests and consults completed and resulted Not met  -vital signs normal or at patient baseline met  -adequate pain control on oral analgesics partially met  -returns to baseline functional status not met  -safe disposition plan has been identified not met  Nurse to notify provider when observation goals have been met and patient is ready for discharge.
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
## 2022-08-18 NOTE — PLAN OF CARE
[facility]      OUTPATIENT PHYSICAL THERAPY EVALUATION  PLAN OF TREATMENT FOR OUTPATIENT REHABILITATION  (COMPLETE FOR INITIAL CLAIMS ONLY)  Patient's Last Name, First Name, M.I.  YOB: 1928  Shira Salas                           Provider's Name  [facility] Medical Record No.  [id number]                               Onset Date:  08/18/22   Start of Care Date:  08/18/22      Type:     _X_PT   ___OT   ___SLP Medical Diagnosis:  Low back pain                        PT Diagnosis:  Impaired gait   Visits from SOC:  1   _________________________________________________________________________________  Plan of Treatment/Functional Goals    Planned Interventions: balance training, bed mobility training, gait training, patient/family education, strengthening, transfer training     Goals: See Physical Therapy Goals on Care Plan in [employer] electronic health record.    Therapy Frequency: 3x/week  Predicted Duration of Therapy Intervention: 08/23/22  _________________________________________________________________________________    I CERTIFY THE NEED FOR THESE SERVICES FURNISHED UNDER        THIS PLAN OF TREATMENT AND WHILE UNDER MY CARE     (Physician co-signature of this document indicates review and certification of the therapy plan).              Certification date from: 08/18/22, Certification date to: 08/23/22    Referring Physician: Ruddy Buchanan MD            Initial Assessment        See Physical Therapy evaluation dated 08/18/22 in Epic electronic health record.

## 2022-08-18 NOTE — PROGRESS NOTES
PRIOR TO DISCHARGE        Comments:   -diagnostic tests and consults completed and resulted Not met  -vital signs normal or at patient baseline met  -adequate pain control on oral analgesics partial  -returns to baseline functional status not met  -safe disposition plan has been identified not met  Nurse to notify provider when observation goals have been met and patient is ready for discharge.

## 2022-08-18 NOTE — CONSULTS
Care Management Initial Consult    General Information  Assessment completed with: Patient,    Type of CM/SW Visit: Initial Assessment    Primary Care Provider verified and updated as needed: No   Readmission within the last 30 days: no previous admission in last 30 days      Reason for Consult: discharge planning  Advance Care Planning: Advance Care Planning Reviewed: other (see comments) (No acp docs)          Communication Assessment  Patient's communication style: spoken language (English or Bilingual)    Hearing Difficulty or Deaf: no   Wear Glasses or Blind: yes    Cognitive  Cognitive/Neuro/Behavioral: WDL                      Living Environment:   People in home: alone     Current living Arrangements: independent living facility      Able to return to prior arrangements: yes       Family/Social Support:  Care provided by: self  Provides care for: no one  Marital Status:   Children          Description of Support System: Supportive, Involved    Support Assessment: Adequate social supports, Adequate family and caregiver support    Current Resources:   Patient receiving home care services: No     Community Resources: None  Equipment currently used at home: none  Supplies currently used at home: None    Employment/Financial:  Employment Status: retired        Financial Concerns:             Lifestyle & Psychosocial Needs:  Social Determinants of Health     Tobacco Use: Low Risk      Smoking Tobacco Use: Never Smoker     Smokeless Tobacco Use: Never Used   Alcohol Use: Not on file   Financial Resource Strain: Not on file   Food Insecurity: Not on file   Transportation Needs: Not on file   Physical Activity: Not on file   Stress: Not on file   Social Connections: Not on file   Intimate Partner Violence: Not on file   Depression: Not at risk     PHQ-2 Score: 0   Housing Stability: Not on file       Functional Status:  Prior to admission patient needed assistance:              Mental Health Status:           Chemical Dependency Status:                Values/Beliefs:  Spiritual, Cultural Beliefs, Shinto Practices, Values that affect care: no               Additional Information:  Per care management/social work consult for discharge planning, patient was admitted on 8/17/2022 with back pain. Tentative date of discharge is 8/19/2022. Writer reviewed chart and saw that PT is recommending TCU for patient. Writer talked to patient and she said that she lives at [facility] ind[facility]. She doesn't receive any services as of now. She is still cooking and is independent at home. She said that she wasn't using any equipment before the hospital, but is thinking about getting a walker when she gets home. Writer mentioned tcu referral and she said that she would like to go to [facility] TCU. She said that her dtr, Nicole, could drive at discharge. Patient has all of her covid shots and boosters.     Writer sent referral via DOD to [facility] TCU. [facility]CU said that they would be able to accept patient. She asked for writer to complete the [employer] auth for patient.     Will continue to follow.    BALTAZAR Goodwin

## 2022-08-19 ENCOUNTER — LAB REQUISITION (OUTPATIENT)
Dept: LAB | Facility: CLINIC | Age: 87
End: 2022-08-19
Payer: COMMERCIAL

## 2022-08-19 VITALS
RESPIRATION RATE: 16 BRPM | WEIGHT: 97.5 LBS | HEIGHT: 57 IN | OXYGEN SATURATION: 94 % | HEART RATE: 83 BPM | DIASTOLIC BLOOD PRESSURE: 78 MMHG | SYSTOLIC BLOOD PRESSURE: 143 MMHG | TEMPERATURE: 98.9 F | BODY MASS INDEX: 21.04 KG/M2

## 2022-08-19 DIAGNOSIS — I10 ESSENTIAL (PRIMARY) HYPERTENSION: ICD-10-CM

## 2022-08-19 PROCEDURE — G0378 HOSPITAL OBSERVATION PER HR: HCPCS

## 2022-08-19 PROCEDURE — 250N000013 HC RX MED GY IP 250 OP 250 PS 637: Performed by: HOSPITALIST

## 2022-08-19 PROCEDURE — 99217 PR OBSERVATION CARE DISCHARGE: CPT | Performed by: INTERNAL MEDICINE

## 2022-08-19 RX ORDER — ACETAMINOPHEN 325 MG/1
975 TABLET ORAL EVERY 8 HOURS
DISCHARGE
Start: 2022-08-19 | End: 2022-09-27

## 2022-08-19 RX ORDER — ONDANSETRON 4 MG/1
4 TABLET, ORALLY DISINTEGRATING ORAL EVERY 6 HOURS PRN
DISCHARGE
Start: 2022-08-19 | End: 2024-08-11

## 2022-08-19 RX ORDER — AMOXICILLIN 250 MG
1 CAPSULE ORAL 2 TIMES DAILY
DISCHARGE
Start: 2022-08-19 | End: 2022-09-27

## 2022-08-19 RX ORDER — BISACODYL 10 MG
10 SUPPOSITORY, RECTAL RECTAL DAILY PRN
DISCHARGE
Start: 2022-08-19 | End: 2022-09-27

## 2022-08-19 RX ORDER — OXYCODONE HYDROCHLORIDE 5 MG/1
2.5 TABLET ORAL EVERY 4 HOURS PRN
Qty: 5 TABLET | Refills: 0 | Status: ON HOLD | OUTPATIENT
Start: 2022-08-19 | End: 2022-09-28

## 2022-08-19 RX ADMIN — ACETAMINOPHEN 975 MG: 325 TABLET, FILM COATED ORAL at 06:05

## 2022-08-19 RX ADMIN — ACETAMINOPHEN 975 MG: 325 TABLET, FILM COATED ORAL at 15:39

## 2022-08-19 RX ADMIN — SENNOSIDES AND DOCUSATE SODIUM 1 TABLET: 50; 8.6 TABLET ORAL at 08:14

## 2022-08-19 ASSESSMENT — ACTIVITIES OF DAILY LIVING (ADL)
ADLS_ACUITY_SCORE: 39
ADLS_ACUITY_SCORE: 39
ADLS_ACUITY_SCORE: 43
ADLS_ACUITY_SCORE: 39
ADLS_ACUITY_SCORE: 43
ADLS_ACUITY_SCORE: 39

## 2022-08-19 NOTE — PLAN OF CARE
Goal Outcome Evaluation:    Plan of Care Reviewed With: patient   Overall Patient Progress: improving    Observation goals  PRIOR TO DISCHARGE        Comments:   -diagnostic tests and consults completed and resulted :met   -vital signs normal or at patient baseline :met   -adequate pain control on oral analgesics :met   -returns to baseline functional status :met   -safe disposition plan has been identified :met   Nurse to notify provider when observation goals have been met and patient is ready for discharge.

## 2022-08-19 NOTE — PROGRESS NOTES
Goal Outcome Evaluation:     Plan of Care Reviewed With: patient   Overall Patient Progress: met; adequate for care transition      Observation goals  PRIOR TO DISCHARGE        Comments:   -diagnostic tests and consults completed and resulted :met   -vital signs normal or at patient baseline :met, bp slightly elevated  -adequate pain control on oral analgesics :met   -returns to baseline functional status :met   -safe disposition plan has been identified :met   Nurse to notify provider when observation goals have been met and patient is ready for discharge.

## 2022-08-19 NOTE — PROGRESS NOTES
Goal Outcome Evaluation:     Plan of Care Reviewed With: patient   Overall Patient Progress: met; adequate for care transition      Observation goals  PRIOR TO DISCHARGE        Comments:   -diagnostic tests and consults completed and resulted :met   -vital signs normal or at patient baseline :met   -adequate pain control on oral analgesics :met   -returns to baseline functional status :met   -safe disposition plan has been identified :met   Nurse to notify provider when observation goals have been met and patient is ready for discharge.

## 2022-08-19 NOTE — PROGRESS NOTES
Care Management Follow Up    Length of Stay (days): 0    Expected Discharge Date: 08/19/2022     Concerns to be Addressed:       Patient plan of care discussed at interdisciplinary rounds: Yes    Anticipated Discharge Disposition: Transitional Care     Anticipated Discharge Services: None  Anticipated Discharge DME: None    Patient/family educated on Medicare website which has current facility and service quality ratings: yes  Education Provided on the Discharge Plan:    Patient/Family in Agreement with the Plan: yes    Referrals Placed by CM/SW:    Private pay costs discussed: Not applicable    Additional Information:  Writer left a message for [facility] asking when they could take patient today. [facility] said that they could take her 1500 or after. Writer called Nicole (dtr) and she said she will pick patient up at 1500. Writer called [facility] and let her know that patient will come at 1500.    Writer completed PAS, put in chart and sent to facility. Writer faxed scripts to [facility]. Received discharge orders and faxed to [facility].    PAS-RR    D: Per DHS regulation, SW completed and submitted PAS-RR to [place] [employer] Direct Connect via the Senior LinkAge Line.  PAS-RR confirmation # is : [id number]    I: SW spoke with patient and they are aware a PAS-RR has been submitted.  SW reviewed with patient that they may be contacted for a follow up appointment within 10 days of hospital discharge if their SNF stay is < 30 days.  Contact information for Senior [employer]Age Line was also provided.    A: Patient verbalized understanding.    P: Further questions may be directed to [facility] LinkAge Line at #[phone], option #4 for PAS-RR staff.      Will continue to follow.    BALTAZAR Goodwin

## 2022-08-19 NOTE — PROVIDER NOTIFICATION
MD Notification    Notified Person: MD    Notified Person Name:Lucas Pina MD    Notification Date/Time: 8-19 1216    Notification Interaction:vocera msg    Purpose of Notification:Ortho note complete. Plan for disch 1500. Pt still refusing pain med.  Orders Received:    Comments:

## 2022-08-19 NOTE — PLAN OF CARE
Orientation/Cognitive: A&O x3. Dis to time.   Observation Goals (Met/ Not Met): Met, med cleared for disch  Mobility Level/Assist Equipment: A1 gb/w WBAT. Non displaced Sacral fracture.   Fall Risk (Y/N): Y  Behavior Concerns: N  Pain Management: denies. Pain with ambulation. Scheduled tylenol. Refusing oxycodone.   Tele/VS/O2: VSS on RA, bp slightly elevated  ABNL Lab/BG: no  Diet: reg  Bowel/Bladder: incont at times  Skin Concerns: Bruise L hip and scattered  Drains/Devices: no  Tests/Procedures for next shift: UA  Anticipated DC date & active delays: 8-19 Prn follow up [facility]  Patient Stated Goal for Today: nervous but ready to leave

## 2022-08-19 NOTE — PLAN OF CARE
Orientation/Cognitive: AOx4  Observation Goals (Met/ Not Met): met; adequate for care transition   Mobility Level/Assist Equipment: A1 GB/W, pivot to commode  Fall Risk (Y/N): Yes   Behavior Concerns: Green  Pain Management: Scheduled Robaxin and Tylenol, Oxycodone 2.5 mg  PRN PO x1   Tele/VS/O2: VSS on RA  ABNL Lab/BG: None  Diet: Regular  Bowel/Bladder: Continent, voiding using commode  Skin Concerns: Bruise to L upper thigh and L shin  Drains/Devices: PIV SL  Tests/Procedures for next shift: None  Anticipated DC date & active delays: 8/19/22 to [facility] TCU, DTR to provide Transport, awaiting time of admission from [facility]   Patient Stated Goal for Today: Get some rest       Goal Outcome Evaluation:     Plan of Care Reviewed With: patient   Overall Patient Progress: met; adequate for care transition      Observation goals  PRIOR TO DISCHARGE        Comments:   -diagnostic tests and consults completed and resulted :met   -vital signs normal or at patient baseline :met   -adequate pain control on oral analgesics :met   -returns to baseline functional status :met   -safe disposition plan has been identified :met   Nurse to notify provider when observation goals have been met and patient is ready for discharge.
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
## 2022-08-19 NOTE — PLAN OF CARE
Pt is discharging to [facility] TCU at this time w/ all pt's belonging. AVS, script and education given. Questions answered. Declnig UA to be collected, no more burning with urination per pt.  Pt's Daughter will transport.      Plan of Care Reviewed With: patient, daughter
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
## 2022-08-22 LAB
ANION GAP SERPL CALCULATED.3IONS-SCNC: 10 MMOL/L (ref 7–15)
BUN SERPL-MCNC: 23.6 MG/DL (ref 8–23)
CALCIUM SERPL-MCNC: 8.9 MG/DL (ref 8.2–9.6)
CHLORIDE SERPL-SCNC: 107 MMOL/L (ref 98–107)
CREAT SERPL-MCNC: 0.69 MG/DL (ref 0.51–0.95)
DEPRECATED HCO3 PLAS-SCNC: 23 MMOL/L (ref 22–29)
GFR SERPL CREATININE-BSD FRML MDRD: 80 ML/MIN/1.73M2
GLUCOSE SERPL-MCNC: 85 MG/DL (ref 70–99)
POTASSIUM SERPL-SCNC: 4 MMOL/L (ref 3.4–5.3)
SODIUM SERPL-SCNC: 140 MMOL/L (ref 136–145)

## 2022-08-22 PROCEDURE — P9604 ONE-WAY ALLOW PRORATED TRIP: HCPCS | Mod: ORL | Performed by: INTERNAL MEDICINE

## 2022-08-22 PROCEDURE — 36415 COLL VENOUS BLD VENIPUNCTURE: CPT | Mod: ORL | Performed by: INTERNAL MEDICINE

## 2022-08-22 PROCEDURE — 80048 BASIC METABOLIC PNL TOTAL CA: CPT | Mod: ORL | Performed by: INTERNAL MEDICINE

## 2022-09-26 ENCOUNTER — APPOINTMENT (OUTPATIENT)
Dept: GENERAL RADIOLOGY | Facility: CLINIC | Age: 87
DRG: 956 | End: 2022-09-26
Attending: EMERGENCY MEDICINE
Payer: COMMERCIAL

## 2022-09-26 ENCOUNTER — APPOINTMENT (OUTPATIENT)
Dept: MRI IMAGING | Facility: CLINIC | Age: 87
DRG: 956 | End: 2022-09-26
Attending: EMERGENCY MEDICINE
Payer: COMMERCIAL

## 2022-09-26 ENCOUNTER — HOSPITAL ENCOUNTER (INPATIENT)
Facility: CLINIC | Age: 87
LOS: 4 days | Discharge: NURSING, REHAB OR LONG-TERM CARE | DRG: 956 | End: 2022-09-30
Attending: EMERGENCY MEDICINE | Admitting: INTERNAL MEDICINE
Payer: COMMERCIAL
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
## 2022-09-26 DIAGNOSIS — S72.145A CLOSED NONDISPLACED INTERTROCHANTERIC FRACTURE OF LEFT FEMUR, INITIAL ENCOUNTER (H): ICD-10-CM

## 2022-09-26 LAB
ANION GAP SERPL CALCULATED.3IONS-SCNC: 9 MMOL/L (ref 3–14)
BASOPHILS # BLD AUTO: 0.1 10E3/UL (ref 0–0.2)
BASOPHILS NFR BLD AUTO: 1 %
BUN SERPL-MCNC: 28 MG/DL (ref 7–30)
CALCIUM SERPL-MCNC: 8.7 MG/DL (ref 8.5–10.1)
CHLORIDE BLD-SCNC: 109 MMOL/L (ref 94–109)
CO2 SERPL-SCNC: 23 MMOL/L (ref 20–32)
CREAT SERPL-MCNC: 0.78 MG/DL (ref 0.52–1.04)
EOSINOPHIL # BLD AUTO: 0.3 10E3/UL (ref 0–0.7)
EOSINOPHIL NFR BLD AUTO: 3 %
ERYTHROCYTE [DISTWIDTH] IN BLOOD BY AUTOMATED COUNT: 13.1 % (ref 10–15)
GFR SERPL CREATININE-BSD FRML MDRD: 70 ML/MIN/1.73M2
GLUCOSE BLD-MCNC: 110 MG/DL (ref 70–99)
HCT VFR BLD AUTO: 37.4 % (ref 35–47)
HGB BLD-MCNC: 12.5 G/DL (ref 11.7–15.7)
IMM GRANULOCYTES # BLD: 0 10E3/UL
IMM GRANULOCYTES NFR BLD: 1 %
LYMPHOCYTES # BLD AUTO: 1.3 10E3/UL (ref 0.8–5.3)
LYMPHOCYTES NFR BLD AUTO: 16 %
MCH RBC QN AUTO: 30.7 PG (ref 26.5–33)
MCHC RBC AUTO-ENTMCNC: 33.4 G/DL (ref 31.5–36.5)
MCV RBC AUTO: 92 FL (ref 78–100)
MONOCYTES # BLD AUTO: 0.8 10E3/UL (ref 0–1.3)
MONOCYTES NFR BLD AUTO: 10 %
NEUTROPHILS # BLD AUTO: 5.7 10E3/UL (ref 1.6–8.3)
NEUTROPHILS NFR BLD AUTO: 69 %
NRBC # BLD AUTO: 0 10E3/UL
NRBC BLD AUTO-RTO: 0 /100
PLATELET # BLD AUTO: 233 10E3/UL (ref 150–450)
POTASSIUM BLD-SCNC: 4 MMOL/L (ref 3.4–5.3)
RBC # BLD AUTO: 4.07 10E6/UL (ref 3.8–5.2)
SODIUM SERPL-SCNC: 141 MMOL/L (ref 133–144)
WBC # BLD AUTO: 8.1 10E3/UL (ref 4–11)

## 2022-09-26 PROCEDURE — 73718 MRI LOWER EXTREMITY W/O DYE: CPT | Mod: LT

## 2022-09-26 PROCEDURE — 80048 BASIC METABOLIC PNL TOTAL CA: CPT | Performed by: EMERGENCY MEDICINE

## 2022-09-26 PROCEDURE — 0QH736Z INSERTION OF INTRAMEDULLARY INTERNAL FIXATION DEVICE INTO LEFT UPPER FEMUR, PERCUTANEOUS APPROACH: ICD-10-PCS | Performed by: ORTHOPAEDIC SURGERY

## 2022-09-26 PROCEDURE — 36415 COLL VENOUS BLD VENIPUNCTURE: CPT | Performed by: EMERGENCY MEDICINE

## 2022-09-26 PROCEDURE — 27238 TREAT THIGH FRACTURE: CPT | Mod: LT

## 2022-09-26 PROCEDURE — 73552 X-RAY EXAM OF FEMUR 2/>: CPT | Mod: RT

## 2022-09-26 PROCEDURE — C9803 HOPD COVID-19 SPEC COLLECT: HCPCS

## 2022-09-26 PROCEDURE — 99222 1ST HOSP IP/OBS MODERATE 55: CPT | Mod: AI | Performed by: INTERNAL MEDICINE

## 2022-09-26 PROCEDURE — 85025 COMPLETE CBC W/AUTO DIFF WBC: CPT | Performed by: EMERGENCY MEDICINE

## 2022-09-26 PROCEDURE — 99285 EMERGENCY DEPT VISIT HI MDM: CPT | Mod: 25

## 2022-09-26 PROCEDURE — 120N000001 HC R&B MED SURG/OB

## 2022-09-26 PROCEDURE — 250N000013 HC RX MED GY IP 250 OP 250 PS 637: Performed by: EMERGENCY MEDICINE

## 2022-09-26 PROCEDURE — 73502 X-RAY EXAM HIP UNI 2-3 VIEWS: CPT

## 2022-09-26 RX ORDER — HYDROCODONE BITARTRATE AND ACETAMINOPHEN 5; 325 MG/1; MG/1
1 TABLET ORAL ONCE
Status: COMPLETED | OUTPATIENT
Start: 2022-09-26 | End: 2022-09-26

## 2022-09-26 RX ADMIN — HYDROCODONE BITARTRATE AND ACETAMINOPHEN 1 TABLET: 5; 325 TABLET ORAL at 22:43

## 2022-09-26 ASSESSMENT — ACTIVITIES OF DAILY LIVING (ADL)
ADLS_ACUITY_SCORE: 35
ADLS_ACUITY_SCORE: 35

## 2022-09-26 ASSESSMENT — ENCOUNTER SYMPTOMS
LIGHT-HEADEDNESS: 0
DIZZINESS: 0
ARTHRALGIAS: 1
NECK PAIN: 0

## 2022-09-27 ENCOUNTER — ANESTHESIA (OUTPATIENT)
Dept: SURGERY | Facility: CLINIC | Age: 87
DRG: 956 | End: 2022-09-27
Payer: COMMERCIAL

## 2022-09-27 ENCOUNTER — ANESTHESIA EVENT (OUTPATIENT)
Dept: SURGERY | Facility: CLINIC | Age: 87
DRG: 956 | End: 2022-09-27
Payer: COMMERCIAL

## 2022-09-27 ENCOUNTER — DOCUMENTATION ONLY (OUTPATIENT)
Dept: OTHER | Facility: CLINIC | Age: 87
End: 2022-09-27

## 2022-09-27 ENCOUNTER — APPOINTMENT (OUTPATIENT)
Dept: GENERAL RADIOLOGY | Facility: CLINIC | Age: 87
DRG: 956 | End: 2022-09-27
Attending: INTERNAL MEDICINE
Payer: COMMERCIAL

## 2022-09-27 LAB — SARS-COV-2 RNA RESP QL NAA+PROBE: NEGATIVE

## 2022-09-27 PROCEDURE — 258N000003 HC RX IP 258 OP 636: Performed by: ANESTHESIOLOGY

## 2022-09-27 PROCEDURE — 258N000003 HC RX IP 258 OP 636: Performed by: ORTHOPAEDIC SURGERY

## 2022-09-27 PROCEDURE — 250N000011 HC RX IP 250 OP 636: Performed by: PHYSICIAN ASSISTANT

## 2022-09-27 PROCEDURE — 272N000001 HC OR GENERAL SUPPLY STERILE: Performed by: ORTHOPAEDIC SURGERY

## 2022-09-27 PROCEDURE — 120N000001 HC R&B MED SURG/OB

## 2022-09-27 PROCEDURE — 250N000013 HC RX MED GY IP 250 OP 250 PS 637: Performed by: ORTHOPAEDIC SURGERY

## 2022-09-27 PROCEDURE — 250N000011 HC RX IP 250 OP 636: Performed by: NURSE ANESTHETIST, CERTIFIED REGISTERED

## 2022-09-27 PROCEDURE — 250N000013 HC RX MED GY IP 250 OP 250 PS 637: Performed by: INTERNAL MEDICINE

## 2022-09-27 PROCEDURE — 250N000011 HC RX IP 250 OP 636: Performed by: ANESTHESIOLOGY

## 2022-09-27 PROCEDURE — 360N000084 HC SURGERY LEVEL 4 W/ FLUORO, PER MIN: Performed by: ORTHOPAEDIC SURGERY

## 2022-09-27 PROCEDURE — 370N000017 HC ANESTHESIA TECHNICAL FEE, PER MIN: Performed by: ORTHOPAEDIC SURGERY

## 2022-09-27 PROCEDURE — 99232 SBSQ HOSP IP/OBS MODERATE 35: CPT | Performed by: INTERNAL MEDICINE

## 2022-09-27 PROCEDURE — 250N000009 HC RX 250: Performed by: PHYSICIAN ASSISTANT

## 2022-09-27 PROCEDURE — 250N000009 HC RX 250: Performed by: ANESTHESIOLOGY

## 2022-09-27 PROCEDURE — 999N000180 XR SURGERY CARM FLUORO LESS THAN 5 MIN

## 2022-09-27 PROCEDURE — U0003 INFECTIOUS AGENT DETECTION BY NUCLEIC ACID (DNA OR RNA); SEVERE ACUTE RESPIRATORY SYNDROME CORONAVIRUS 2 (SARS-COV-2) (CORONAVIRUS DISEASE [COVID-19]), AMPLIFIED PROBE TECHNIQUE, MAKING USE OF HIGH THROUGHPUT TECHNOLOGIES AS DESCRIBED BY CMS-2020-01-R: HCPCS | Performed by: EMERGENCY MEDICINE

## 2022-09-27 PROCEDURE — 250N000009 HC RX 250: Performed by: NURSE ANESTHETIST, CERTIFIED REGISTERED

## 2022-09-27 PROCEDURE — 999N000141 HC STATISTIC PRE-PROCEDURE NURSING ASSESSMENT: Performed by: ORTHOPAEDIC SURGERY

## 2022-09-27 PROCEDURE — 250N000011 HC RX IP 250 OP 636: Performed by: INTERNAL MEDICINE

## 2022-09-27 PROCEDURE — 258N000003 HC RX IP 258 OP 636: Performed by: INTERNAL MEDICINE

## 2022-09-27 PROCEDURE — C1769 GUIDE WIRE: HCPCS | Performed by: ORTHOPAEDIC SURGERY

## 2022-09-27 PROCEDURE — 250N000009 HC RX 250: Performed by: ORTHOPAEDIC SURGERY

## 2022-09-27 PROCEDURE — 250N000025 HC SEVOFLURANE, PER MIN: Performed by: ORTHOPAEDIC SURGERY

## 2022-09-27 PROCEDURE — C1713 ANCHOR/SCREW BN/BN,TIS/BN: HCPCS | Performed by: ORTHOPAEDIC SURGERY

## 2022-09-27 PROCEDURE — 710N000009 HC RECOVERY PHASE 1, LEVEL 1, PER MIN: Performed by: ORTHOPAEDIC SURGERY

## 2022-09-27 DEVICE — IMPLANTABLE DEVICE: Type: IMPLANTABLE DEVICE | Site: HIP | Status: FUNCTIONAL

## 2022-09-27 DEVICE — IMP NAIL SYN CAN FEM PROX TFNA 11X170MM 130D 04.037.142S: Type: IMPLANTABLE DEVICE | Site: HIP | Status: FUNCTIONAL

## 2022-09-27 DEVICE — IMP SCR SYN TFNA FENESTRATED LAG 90MM 04.038.190S: Type: IMPLANTABLE DEVICE | Site: HIP | Status: FUNCTIONAL

## 2022-09-27 RX ORDER — TRANEXAMIC ACID 10 MG/ML
1 INJECTION, SOLUTION INTRAVENOUS ONCE
Status: COMPLETED | OUTPATIENT
Start: 2022-09-27 | End: 2022-09-27

## 2022-09-27 RX ORDER — ONDANSETRON 2 MG/ML
4 INJECTION INTRAMUSCULAR; INTRAVENOUS EVERY 6 HOURS PRN
Status: DISCONTINUED | OUTPATIENT
Start: 2022-09-27 | End: 2022-09-27

## 2022-09-27 RX ORDER — SODIUM CHLORIDE, SODIUM LACTATE, POTASSIUM CHLORIDE, CALCIUM CHLORIDE 600; 310; 30; 20 MG/100ML; MG/100ML; MG/100ML; MG/100ML
INJECTION, SOLUTION INTRAVENOUS CONTINUOUS
Status: DISCONTINUED | OUTPATIENT
Start: 2022-09-27 | End: 2022-09-27 | Stop reason: HOSPADM

## 2022-09-27 RX ORDER — LIDOCAINE 40 MG/G
CREAM TOPICAL
Status: DISCONTINUED | OUTPATIENT
Start: 2022-09-27 | End: 2022-09-30 | Stop reason: HOSPADM

## 2022-09-27 RX ORDER — ACETAMINOPHEN 650 MG/1
650 SUPPOSITORY RECTAL EVERY 6 HOURS PRN
Status: DISCONTINUED | OUTPATIENT
Start: 2022-09-27 | End: 2022-09-30 | Stop reason: HOSPADM

## 2022-09-27 RX ORDER — ACETAMINOPHEN 325 MG/1
975 TABLET ORAL EVERY 8 HOURS
Status: COMPLETED | OUTPATIENT
Start: 2022-09-27 | End: 2022-09-30

## 2022-09-27 RX ORDER — OXYCODONE HYDROCHLORIDE 5 MG/1
5 TABLET ORAL EVERY 4 HOURS PRN
Status: DISCONTINUED | OUTPATIENT
Start: 2022-09-27 | End: 2022-09-29

## 2022-09-27 RX ORDER — HYDROMORPHONE HYDROCHLORIDE 1 MG/ML
0.3 INJECTION, SOLUTION INTRAMUSCULAR; INTRAVENOUS; SUBCUTANEOUS
Status: DISCONTINUED | OUTPATIENT
Start: 2022-09-27 | End: 2022-09-27

## 2022-09-27 RX ORDER — POLYETHYLENE GLYCOL 3350 17 G/17G
17 POWDER, FOR SOLUTION ORAL DAILY PRN
Status: DISCONTINUED | OUTPATIENT
Start: 2022-09-27 | End: 2022-09-30 | Stop reason: HOSPADM

## 2022-09-27 RX ORDER — NALOXONE HYDROCHLORIDE 0.4 MG/ML
0.2 INJECTION, SOLUTION INTRAMUSCULAR; INTRAVENOUS; SUBCUTANEOUS
Status: DISCONTINUED | OUTPATIENT
Start: 2022-09-27 | End: 2022-09-30 | Stop reason: HOSPADM

## 2022-09-27 RX ORDER — AMOXICILLIN 250 MG
1 CAPSULE ORAL 2 TIMES DAILY PRN
Status: DISCONTINUED | OUTPATIENT
Start: 2022-09-27 | End: 2022-09-30 | Stop reason: HOSPADM

## 2022-09-27 RX ORDER — PROCHLORPERAZINE MALEATE 5 MG
5 TABLET ORAL EVERY 6 HOURS PRN
Status: DISCONTINUED | OUTPATIENT
Start: 2022-09-27 | End: 2022-09-30 | Stop reason: HOSPADM

## 2022-09-27 RX ORDER — ONDANSETRON 2 MG/ML
4 INJECTION INTRAMUSCULAR; INTRAVENOUS EVERY 30 MIN PRN
Status: DISCONTINUED | OUTPATIENT
Start: 2022-09-27 | End: 2022-09-27 | Stop reason: HOSPADM

## 2022-09-27 RX ORDER — HYDROMORPHONE HCL IN WATER/PF 6 MG/30 ML
0.2 PATIENT CONTROLLED ANALGESIA SYRINGE INTRAVENOUS EVERY 5 MIN PRN
Status: DISCONTINUED | OUTPATIENT
Start: 2022-09-27 | End: 2022-09-27 | Stop reason: HOSPADM

## 2022-09-27 RX ORDER — SODIUM CHLORIDE, SODIUM LACTATE, POTASSIUM CHLORIDE, CALCIUM CHLORIDE 600; 310; 30; 20 MG/100ML; MG/100ML; MG/100ML; MG/100ML
INJECTION, SOLUTION INTRAVENOUS CONTINUOUS
Status: DISCONTINUED | OUTPATIENT
Start: 2022-09-27 | End: 2022-09-28

## 2022-09-27 RX ORDER — FAMOTIDINE 20 MG/1
20 TABLET, FILM COATED ORAL DAILY
Status: DISCONTINUED | OUTPATIENT
Start: 2022-09-27 | End: 2022-09-30 | Stop reason: HOSPADM

## 2022-09-27 RX ORDER — ONDANSETRON 2 MG/ML
INJECTION INTRAMUSCULAR; INTRAVENOUS PRN
Status: DISCONTINUED | OUTPATIENT
Start: 2022-09-27 | End: 2022-09-27

## 2022-09-27 RX ORDER — ACETAMINOPHEN 325 MG/1
650 TABLET ORAL EVERY 6 HOURS PRN
Status: DISCONTINUED | OUTPATIENT
Start: 2022-09-27 | End: 2022-09-30 | Stop reason: HOSPADM

## 2022-09-27 RX ORDER — AMOXICILLIN 250 MG
1 CAPSULE ORAL 2 TIMES DAILY
Status: DISCONTINUED | OUTPATIENT
Start: 2022-09-27 | End: 2022-09-30 | Stop reason: HOSPADM

## 2022-09-27 RX ORDER — HYDROMORPHONE HCL IN WATER/PF 6 MG/30 ML
0.2 PATIENT CONTROLLED ANALGESIA SYRINGE INTRAVENOUS
Status: DISCONTINUED | OUTPATIENT
Start: 2022-09-27 | End: 2022-09-30 | Stop reason: HOSPADM

## 2022-09-27 RX ORDER — ENOXAPARIN SODIUM 100 MG/ML
40 INJECTION SUBCUTANEOUS EVERY 24 HOURS
Status: DISCONTINUED | OUTPATIENT
Start: 2022-09-28 | End: 2022-09-30 | Stop reason: HOSPADM

## 2022-09-27 RX ORDER — HYDROXYZINE HYDROCHLORIDE 10 MG/1
10 TABLET, FILM COATED ORAL EVERY 6 HOURS PRN
Status: DISCONTINUED | OUTPATIENT
Start: 2022-09-27 | End: 2022-09-30 | Stop reason: HOSPADM

## 2022-09-27 RX ORDER — OMEGA-3 FATTY ACIDS/FISH OIL 300-1000MG
200 CAPSULE ORAL EVERY 4 HOURS PRN
COMMUNITY

## 2022-09-27 RX ORDER — MAGNESIUM HYDROXIDE 1200 MG/15ML
LIQUID ORAL PRN
Status: DISCONTINUED | OUTPATIENT
Start: 2022-09-27 | End: 2022-09-27 | Stop reason: HOSPADM

## 2022-09-27 RX ORDER — HYDRALAZINE HYDROCHLORIDE 20 MG/ML
10 INJECTION INTRAMUSCULAR; INTRAVENOUS EVERY 4 HOURS PRN
Status: DISCONTINUED | OUTPATIENT
Start: 2022-09-27 | End: 2022-09-30 | Stop reason: HOSPADM

## 2022-09-27 RX ORDER — ONDANSETRON 2 MG/ML
4 INJECTION INTRAMUSCULAR; INTRAVENOUS EVERY 6 HOURS PRN
Status: DISCONTINUED | OUTPATIENT
Start: 2022-09-27 | End: 2022-09-30 | Stop reason: HOSPADM

## 2022-09-27 RX ORDER — POLYETHYLENE GLYCOL 3350 17 G/17G
17 POWDER, FOR SOLUTION ORAL DAILY
Status: DISCONTINUED | OUTPATIENT
Start: 2022-09-28 | End: 2022-09-30 | Stop reason: HOSPADM

## 2022-09-27 RX ORDER — AMOXICILLIN 250 MG
2 CAPSULE ORAL 2 TIMES DAILY PRN
Status: DISCONTINUED | OUTPATIENT
Start: 2022-09-27 | End: 2022-09-30 | Stop reason: HOSPADM

## 2022-09-27 RX ORDER — ONDANSETRON 4 MG/1
4 TABLET, ORALLY DISINTEGRATING ORAL EVERY 6 HOURS PRN
Status: DISCONTINUED | OUTPATIENT
Start: 2022-09-27 | End: 2022-09-30 | Stop reason: HOSPADM

## 2022-09-27 RX ORDER — FENTANYL CITRATE 50 UG/ML
50 INJECTION, SOLUTION INTRAMUSCULAR; INTRAVENOUS ONCE
Status: COMPLETED | OUTPATIENT
Start: 2022-09-27 | End: 2022-09-27

## 2022-09-27 RX ORDER — CEFAZOLIN SODIUM 1 G/3ML
1 INJECTION, POWDER, FOR SOLUTION INTRAMUSCULAR; INTRAVENOUS EVERY 8 HOURS
Status: COMPLETED | OUTPATIENT
Start: 2022-09-28 | End: 2022-09-28

## 2022-09-27 RX ORDER — SODIUM CHLORIDE 9 MG/ML
INJECTION, SOLUTION INTRAVENOUS CONTINUOUS
Status: DISCONTINUED | OUTPATIENT
Start: 2022-09-27 | End: 2022-09-27

## 2022-09-27 RX ORDER — FENTANYL CITRATE 50 UG/ML
INJECTION, SOLUTION INTRAMUSCULAR; INTRAVENOUS PRN
Status: DISCONTINUED | OUTPATIENT
Start: 2022-09-27 | End: 2022-09-27

## 2022-09-27 RX ORDER — LIDOCAINE HYDROCHLORIDE 20 MG/ML
INJECTION, SOLUTION INFILTRATION; PERINEURAL PRN
Status: DISCONTINUED | OUTPATIENT
Start: 2022-09-27 | End: 2022-09-27

## 2022-09-27 RX ORDER — AMOXICILLIN 250 MG
1 CAPSULE ORAL 2 TIMES DAILY PRN
COMMUNITY

## 2022-09-27 RX ORDER — CEFAZOLIN SODIUM/WATER 2 G/20 ML
2 SYRINGE (ML) INTRAVENOUS
Status: DISCONTINUED | OUTPATIENT
Start: 2022-09-27 | End: 2022-09-27 | Stop reason: HOSPADM

## 2022-09-27 RX ORDER — ONDANSETRON 4 MG/1
4 TABLET, ORALLY DISINTEGRATING ORAL EVERY 6 HOURS PRN
Status: DISCONTINUED | OUTPATIENT
Start: 2022-09-27 | End: 2022-09-27

## 2022-09-27 RX ORDER — PROPOFOL 10 MG/ML
INJECTION, EMULSION INTRAVENOUS PRN
Status: DISCONTINUED | OUTPATIENT
Start: 2022-09-27 | End: 2022-09-27

## 2022-09-27 RX ORDER — DEXAMETHASONE SODIUM PHOSPHATE 4 MG/ML
INJECTION, SOLUTION INTRA-ARTICULAR; INTRALESIONAL; INTRAMUSCULAR; INTRAVENOUS; SOFT TISSUE PRN
Status: DISCONTINUED | OUTPATIENT
Start: 2022-09-27 | End: 2022-09-27

## 2022-09-27 RX ORDER — ONDANSETRON 4 MG/1
4 TABLET, ORALLY DISINTEGRATING ORAL EVERY 30 MIN PRN
Status: DISCONTINUED | OUTPATIENT
Start: 2022-09-27 | End: 2022-09-27 | Stop reason: HOSPADM

## 2022-09-27 RX ORDER — NALOXONE HYDROCHLORIDE 0.4 MG/ML
0.4 INJECTION, SOLUTION INTRAMUSCULAR; INTRAVENOUS; SUBCUTANEOUS
Status: DISCONTINUED | OUTPATIENT
Start: 2022-09-27 | End: 2022-09-30 | Stop reason: HOSPADM

## 2022-09-27 RX ORDER — BISACODYL 10 MG
10 SUPPOSITORY, RECTAL RECTAL DAILY PRN
Status: DISCONTINUED | OUTPATIENT
Start: 2022-09-27 | End: 2022-09-30 | Stop reason: HOSPADM

## 2022-09-27 RX ORDER — ACETAMINOPHEN 325 MG/1
650 TABLET ORAL EVERY 4 HOURS PRN
Status: DISCONTINUED | OUTPATIENT
Start: 2022-09-30 | End: 2022-09-27

## 2022-09-27 RX ORDER — CEFAZOLIN SODIUM/WATER 2 G/20 ML
2 SYRINGE (ML) INTRAVENOUS SEE ADMIN INSTRUCTIONS
Status: DISCONTINUED | OUTPATIENT
Start: 2022-09-27 | End: 2022-09-27 | Stop reason: HOSPADM

## 2022-09-27 RX ORDER — FENTANYL CITRATE 0.05 MG/ML
25 INJECTION, SOLUTION INTRAMUSCULAR; INTRAVENOUS EVERY 5 MIN PRN
Status: DISCONTINUED | OUTPATIENT
Start: 2022-09-27 | End: 2022-09-27 | Stop reason: HOSPADM

## 2022-09-27 RX ORDER — LANOLIN ALCOHOL/MO/W.PET/CERES
3 CREAM (GRAM) TOPICAL AT BEDTIME
Status: DISCONTINUED | OUTPATIENT
Start: 2022-09-27 | End: 2022-09-30 | Stop reason: HOSPADM

## 2022-09-27 RX ORDER — LIDOCAINE 40 MG/G
CREAM TOPICAL
Status: DISCONTINUED | OUTPATIENT
Start: 2022-09-27 | End: 2022-09-27

## 2022-09-27 RX ADMIN — DEXAMETHASONE SODIUM PHOSPHATE 4 MG: 4 INJECTION, SOLUTION INTRA-ARTICULAR; INTRALESIONAL; INTRAMUSCULAR; INTRAVENOUS; SOFT TISSUE at 16:52

## 2022-09-27 RX ADMIN — FENTANYL CITRATE 25 MCG: 50 INJECTION, SOLUTION INTRAMUSCULAR; INTRAVENOUS at 17:29

## 2022-09-27 RX ADMIN — HYDROMORPHONE HYDROCHLORIDE 0.2 MG: 0.2 INJECTION, SOLUTION INTRAMUSCULAR; INTRAVENOUS; SUBCUTANEOUS at 18:26

## 2022-09-27 RX ADMIN — SODIUM CHLORIDE: 9 INJECTION, SOLUTION INTRAVENOUS at 12:38

## 2022-09-27 RX ADMIN — SODIUM CHLORIDE: 9 INJECTION, SOLUTION INTRAVENOUS at 00:42

## 2022-09-27 RX ADMIN — SODIUM CHLORIDE, POTASSIUM CHLORIDE, SODIUM LACTATE AND CALCIUM CHLORIDE: 600; 310; 30; 20 INJECTION, SOLUTION INTRAVENOUS at 14:31

## 2022-09-27 RX ADMIN — ONDANSETRON 4 MG: 2 INJECTION INTRAMUSCULAR; INTRAVENOUS at 17:13

## 2022-09-27 RX ADMIN — FENTANYL CITRATE 50 MCG: 50 INJECTION, SOLUTION INTRAMUSCULAR; INTRAVENOUS at 17:34

## 2022-09-27 RX ADMIN — FAMOTIDINE 20 MG: 20 TABLET ORAL at 22:06

## 2022-09-27 RX ADMIN — PROPOFOL 150 MG: 10 INJECTION, EMULSION INTRAVENOUS at 16:47

## 2022-09-27 RX ADMIN — HYDROMORPHONE HYDROCHLORIDE 0.3 MG: 1 INJECTION, SOLUTION INTRAMUSCULAR; INTRAVENOUS; SUBCUTANEOUS at 09:37

## 2022-09-27 RX ADMIN — MELATONIN TAB 3 MG 3 MG: 3 TAB at 00:40

## 2022-09-27 RX ADMIN — ACETAMINOPHEN 975 MG: 325 TABLET, FILM COATED ORAL at 22:06

## 2022-09-27 RX ADMIN — HYDROMORPHONE HYDROCHLORIDE 0.3 MG: 1 INJECTION, SOLUTION INTRAMUSCULAR; INTRAVENOUS; SUBCUTANEOUS at 12:12

## 2022-09-27 RX ADMIN — TRANEXAMIC ACID 1 G: 10 INJECTION, SOLUTION INTRAVENOUS at 17:44

## 2022-09-27 RX ADMIN — Medication 2 G: at 16:43

## 2022-09-27 RX ADMIN — SODIUM CHLORIDE, POTASSIUM CHLORIDE, SODIUM LACTATE AND CALCIUM CHLORIDE: 600; 310; 30; 20 INJECTION, SOLUTION INTRAVENOUS at 22:11

## 2022-09-27 RX ADMIN — ONDANSETRON 4 MG: 2 INJECTION INTRAMUSCULAR; INTRAVENOUS at 13:10

## 2022-09-27 RX ADMIN — Medication 30 ML: at 15:21

## 2022-09-27 RX ADMIN — FENTANYL CITRATE 25 MCG: 50 INJECTION, SOLUTION INTRAMUSCULAR; INTRAVENOUS at 16:47

## 2022-09-27 RX ADMIN — TRANEXAMIC ACID 1 G: 10 INJECTION, SOLUTION INTRAVENOUS at 17:04

## 2022-09-27 RX ADMIN — SENNOSIDES AND DOCUSATE SODIUM 1 TABLET: 50; 8.6 TABLET ORAL at 22:23

## 2022-09-27 RX ADMIN — PROPOFOL 50 MG: 10 INJECTION, EMULSION INTRAVENOUS at 17:34

## 2022-09-27 RX ADMIN — MELATONIN TAB 3 MG 3 MG: 3 TAB at 22:07

## 2022-09-27 RX ADMIN — FENTANYL CITRATE 25 MCG: 50 INJECTION, SOLUTION INTRAMUSCULAR; INTRAVENOUS at 15:04

## 2022-09-27 RX ADMIN — LIDOCAINE HYDROCHLORIDE 60 MG: 20 INJECTION, SOLUTION INFILTRATION; PERINEURAL at 16:47

## 2022-09-27 ASSESSMENT — ACTIVITIES OF DAILY LIVING (ADL)
ADLS_ACUITY_SCORE: 35
ADLS_ACUITY_SCORE: 24
WALKING_OR_CLIMBING_STAIRS_DIFFICULTY: YES
ADLS_ACUITY_SCORE: 24
ADLS_ACUITY_SCORE: 30
ADLS_ACUITY_SCORE: 35
FALL_HISTORY_WITHIN_LAST_SIX_MONTHS: YES
CHANGE_IN_FUNCTIONAL_STATUS_SINCE_ONSET_OF_CURRENT_ILLNESS/INJURY: YES
EQUIPMENT_CURRENTLY_USED_AT_HOME: WALKER, ROLLING
DRESSING/BATHING_DIFFICULTY: NO
ADLS_ACUITY_SCORE: 35
DOING_ERRANDS_INDEPENDENTLY_DIFFICULTY: OTHER (SEE COMMENTS)
TOILETING_ISSUES: NO
NUMBER_OF_TIMES_PATIENT_HAS_FALLEN_WITHIN_LAST_SIX_MONTHS: 1
ADLS_ACUITY_SCORE: 35
WALKING_OR_CLIMBING_STAIRS: AMBULATION DIFFICULTY, REQUIRES EQUIPMENT
ADLS_ACUITY_SCORE: 24
ADLS_ACUITY_SCORE: 35
DESCRIBE_HEARING_LOSS: BILATERAL HEARING LOSS
ADLS_ACUITY_SCORE: 35
ADLS_ACUITY_SCORE: 24
WEAR_GLASSES_OR_BLIND: NO
CONCENTRATING,_REMEMBERING_OR_MAKING_DECISIONS_DIFFICULTY: NO
ADLS_ACUITY_SCORE: 33
DIFFICULTY_EATING/SWALLOWING: NO

## 2022-09-27 ASSESSMENT — LIFESTYLE VARIABLES: TOBACCO_USE: 0

## 2022-09-27 ASSESSMENT — ENCOUNTER SYMPTOMS: SEIZURES: 0

## 2022-09-27 NOTE — ANESTHESIA PROCEDURE NOTES
Fascia iliaca Procedure Note    Pre-Procedure   Staff -        Anesthesiologist:  Jian Calvert MD       Performed By: Anesthesiologist       Location: pre-op       Pre-Anesthestic Checklist: patient identified, IV checked, site marked, risks and benefits discussed, informed consent, monitors and equipment checked, pre-op evaluation, at physician/surgeon's request and post-op pain management  Timeout:       Correct Patient: Yes        Correct Procedure: Yes        Correct Site: Yes        Correct Position: Yes        Correct Laterality: Yes        Site Marked: Yes  Procedure Documentation  Procedure: Fascia iliaca       Laterality: left       Patient Position: supine       Patient Prep/Sterile Barriers: sterile gloves, mask       Skin prep: Chloraprep       Local skin infiltrated with 1 mL of 1% lidocaine.        Needle Type: insulated       Needle Gauge: 21.        Needle Length (millimeters): 90        Ultrasound guided       1. Ultrasound was used to identify targeted nerve, plexus, vascular marker, or fascial plane and place a needle adjacent to it in real-time.       2. Ultrasound was used to visualize the spread of anesthetic in close proximity to the above referenced structure.       3. A permanent image is entered into the patient's record.       4. The visualized anatomic structures appeared normal.       5. There were no apparent abnormal pathologic findings.    Assessment/Narrative         The placement was negative for: blood aspirated, painful injection and site bleeding       Paresthesias: No.       Bolus given via needle. no blood aspirated via catheter.        Secured via.        Insertion/Infusion Method: Single Shot       Injection made incrementally with aspirations every 5 mL.    Medication(s) Administered   Bupivacaine 0.25% w/ 1:400K Epi (Injection) - Injection   30 mL - 9/27/2022 3:21:00 PM   Comments:  Bolus via needle,30ml of 0.25% bupivacaine with 1:400,000  epinephrine.    Ultrasound Interpretation, Peripheral Nerve Block    1. Under ultrasound guidance, the needle was inserted and placed in close proximity to the target nerve(s).  2. Ultrasound was also used to visualize the spread of the anesthetic in close proximity to the nerve(s) being blocked.  Local anesthetic was administered in incremental doses, with intermittent negative aspiration.    3. The nerve(s) appeared anatomically normal.  4. There were no apparent abnormal pathological findings.  5. A permanent ultrasound image was saved in the patient's record.    Patient tolerated well, was mildly sedated but communicative throughout the procedure.    No complications.      The surgeon has given a verbal order transferring care of this patient to me for the performance of a regional analgesia block for post-op pain control. It is requested of me because I am uniquely trained and qualified to perform this block and the surgeon is neither trained nor qualified to perform this procedure.    Jian Calvert MD   3:37 PM
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
## 2022-09-27 NOTE — PHARMACY-ADMISSION MEDICATION HISTORY
Pharmacy Medication History  Admission medication history interview status for the 9/26/2022  admission is complete. See EPIC admission navigator for prior to admission medications     Location of Interview: Patient room  Medication history sources: Patient, Patient's family/friend (daughter Alana at bedside) and [employer]    Significant changes made to the medication list:  Added ibuprofen   Removed Tylenol 975 mg q8h (transitional Rx from discharge 8/2022 to facility, patient states taking only ibuprofen PRN at home)  Removed bisacodyl suppository 10 mg daily PRN (transitional Rx from discharge 8/2022 to facility, patient states no suppository supply at home/only taking stool softening tablets PRN at home)   Marked oxycodone as not taking - prescribed as local print from discharge 8/2022 to facility, patient states not having home supply, but [employer] does not show this was ever filled, remains active Rx  Adjusted senna-docusate to PRN (not using oxycodone, which was the reason this was originally prescribed)      In the past week, patient estimated taking medication this percent of the time: greater than 90%, ran out of probiotic 9/26/22. Not taking any scheduled prescription medications.     Additional medication history information:   Patient/daughter are reliable historians. Prescriptions from recent discharge do not appear to have been utilized since patient discharged from facility to home.     Medication reconciliation completed by provider prior to medication history? No    Time spent in this activity: 15 minutes    Medication Sig Last Dose Taking? Auth Provider   Cholecalciferol (VITAMIN D3 PO) Take 1,000 Units by mouth daily 9/26/2022 at AM Yes Reported, Patient   Flaxseed, Linseed, (FLAX SEEDS PO) Take 1 capsule by mouth daily 9/26/2022 at AM Yes Reported, Patient   ibuprofen (ADVIL/MOTRIN) 200 MG capsule Take 200 mg by mouth every 4 hours as needed for mild pain Unknown at PRN Yes Unknown, Entered  By History   multivitamin w/minerals (THERA-VIT-M) tablet Take 1 tablet by mouth daily AREDS FOCUS 9/26/2022 at AM Yes Reported, Patient   ondansetron (ZOFRAN ODT) 4 MG ODT tab Take 1 tablet (4 mg) by mouth every 6 hours as needed for nausea or vomiting More than a month at PRN Yes Lucas Pina MD   Probiotic Product (PROBIOTIC DAILY PO) Take 1 tablet by mouth daily 9/25/2022 at AM Yes Reported, Patient   senna-docusate (SENOKOT-S/PERICOLACE) 8.6-50 MG tablet Take 1 tablet by mouth 2 times daily as needed for constipation Past Month at PRN Yes Unknown, Entered By History   oxyCODONE (ROXICODONE) 5 MG tablet Take 0.5 tablets (2.5 mg) by mouth every 4 hours as needed for moderate to severe pain  Patient not taking: Reported on 9/27/2022 Not Taking at Unknown time  Lucas Pina MD       The information provided in this note is only as accurate as the sources available at the time of update(s)   Fiona Nixon, JordonD

## 2022-09-27 NOTE — ANESTHESIA POSTPROCEDURE EVALUATION
Patient: Shira Salas    Procedure: Procedure(s):  INTERNAL FIXATION LEFT HIP USING INTRAMEDULLARY NAIL.       Anesthesia Type:  General    Note:  Disposition: Inpatient   Postop Pain Control: Uneventful            Sign Out: Well controlled pain   PONV: No   Neuro/Psych: Uneventful            Sign Out: Acceptable/Baseline neuro status   Airway/Respiratory: Uneventful            Sign Out: Acceptable/Baseline resp. status   CV/Hemodynamics: Uneventful            Sign Out: Acceptable CV status; No obvious hypovolemia; No obvious fluid overload   Other NRE: NONE   DID A NON-ROUTINE EVENT OCCUR? No           Last vitals:  Vitals Value Taken Time   /89 09/27/22 1808   Temp 36.1  C (96.9  F) 09/27/22 1808   Pulse 78 09/27/22 1827   Resp 15 09/27/22 1827   SpO2 94 % 09/27/22 1827   Vitals shown include unvalidated device data.    Electronically Signed By: Pete Altman DO, DO  September 27, 2022  6:33 PM

## 2022-09-27 NOTE — ANESTHESIA PREPROCEDURE EVALUATION
Anesthesia Pre-Procedure Evaluation    Patient: Shira Salas   MRN: [id number] : 1928        Procedure : Procedure(s):  INTERNAL FIXATION LEFT HIP USING INTRAMEDULLARY NAIL.          Past Medical History:   Diagnosis Date     Glaucoma      Macular degeneration       Past Surgical History:   Procedure Laterality Date     CATARACT IOL, RT/LT Bilateral      GLAUCOMA SURGERY      Ahmed Valve OS (2/15/16)     HAND SURGERY Right      HYSTERECTOMY       LASER SELECTIVE TRABECULOPLASTY Left      ZZC RAD RESEC TONSIL/PILLARS        Allergies   Allergen Reactions     Sulfa Drugs Hives      Social History     Tobacco Use     Smoking status: Never Smoker     Smokeless tobacco: Never Used   Substance Use Topics     Alcohol use: Not on file      Wt Readings from Last 1 Encounters:   22 52.2 kg (115 lb)        Anesthesia Evaluation            ROS/MED HX  ENT/Pulmonary:    (-) tobacco use, asthma and sleep apnea   Neurologic:    (-) no seizures and no CVA   Cardiovascular:    (-) hypertension   METS/Exercise Tolerance: >4 METS    Hematologic:       Musculoskeletal:   (+) arthritis, fracture, Fracture location: LLE (left hip fracture),     GI/Hepatic:    (-) GERD and liver disease   Renal/Genitourinary:    (-) renal disease   Endo:    (-) Type II DM, thyroid disease and obesity   Psychiatric/Substance Use:       Infectious Disease:       Malignancy:       Other:            Physical Exam    Airway        Mallampati: II   TM distance: > 3 FB   Neck ROM: full   Mouth opening: > 3 cm    Respiratory Devices and Support         Dental  no notable dental history         Cardiovascular          Rhythm and rate: regular and normal     Pulmonary   pulmonary exam normal                OUTSIDE LABS:  CBC:   Lab Results   Component Value Date    WBC 8.1 2022    WBC 9.5 2022    HGB 12.5 2022    HGB 12.7 2022    HCT 37.4 2022    HCT 37.5 2022     2022     2022     BMP:    Lab Results   Component Value Date     09/26/2022     08/22/2022    POTASSIUM 4.0 09/26/2022    POTASSIUM 4.0 08/22/2022    CHLORIDE 109 09/26/2022    CHLORIDE 107 08/22/2022    CO2 23 09/26/2022    CO2 23 08/22/2022    BUN 28 09/26/2022    BUN 23.6 (H) 08/22/2022    CR 0.78 09/26/2022    CR 0.69 08/22/2022     (H) 09/26/2022    GLC 85 08/22/2022     COAGS: No results found for: PTT, INR, FIBR  POC: No results found for: BGM, HCG, HCGS  HEPATIC: No results found for: ALBUMIN, PROTTOTAL, ALT, AST, GGT, ALKPHOS, BILITOTAL, BILIDIRECT, JAJA  OTHER:   Lab Results   Component Value Date    APOLONIA 8.7 09/26/2022       Anesthesia Plan    ASA Status:  2   NPO Status:  NPO Appropriate    Anesthesia Type: General.     - Airway: LMA   Induction: Intravenous, Propofol.   Maintenance: Balanced.        Consents    Anesthesia Plan(s) and associated risks, benefits, and realistic alternatives discussed. Questions answered and patient/representative(s) expressed understanding.    - Discussed:     - Discussed with:  Patient         Postoperative Care    Pain management: Peripheral nerve block (Single Shot), Multi-modal analgesia.   PONV prophylaxis: Ondansetron (or other 5HT-3), Dexamethasone or Solumedrol     Comments:    Other Comments: Fascia iliaca block            Jian Calvert MD

## 2022-09-27 NOTE — PROGRESS NOTES
RECEIVING UNIT ED HANDOFF REVIEW    ED Nurse Handoff Report was reviewed by: Sarihta Mueller RN on September 27, 2022 at 8:03 AM

## 2022-09-27 NOTE — PROVIDER NOTIFICATION
Admitted this AM after falling on her left hip at home. DNR, A/O x4, [place], VSS on RA, PRN Dilaudid given for right hip pain x2, zofran given for nausea x1. NPO all day. SL. Skin WDL. CMS intact. LS clear. Purewick in place.

## 2022-09-27 NOTE — ED NOTES
[facility]  ED Nurse Handoff Report    ED Chief complaint: Fall and Hip Pain      ED Diagnosis:   Final diagnoses:   Closed nondisplaced intertrochanteric fracture of left femur, initial encounter (H)   Fall, initial encounter       Code Status: No CPR, No NOT Intubate    Allergies:   Allergies   Allergen Reactions     Sulfa Drugs Hives       Patient Story: BIBA; Presents from home. Reports mechanical fall in bathroom. Reports left hip pain with movement. Denies pain at rest.    Focused Assessment:  L hip pain with movement, denies pain when still    Treatments and/or interventions provided: Labs, imaging, pain medication, IV fluids    Patient's response to treatments and/or interventions: Decreased pain, see results.    To be done/followed up on inpatient unit:  See orders.    Does this patient have any cognitive concerns?: N/A     Activity level - Baseline/Home:  [place]  Activity Level - Current:   Unknown    Patient's Preferred language: English   Needed?: No    Isolation: None  Infection: Not Applicable  Patient tested for COVID 19 prior to admission: YES  Bariatric?: No    Vital Signs:   Vitals:    09/26/22 1912 09/26/22 1942 09/26/22 1945 09/26/22 2240   BP:   (!) 158/76 (!) 162/84   Pulse:  77 77    Resp: 18   20   Temp:  98.1  F (36.7  C)     TempSrc:  Oral     SpO2: 97% 98% 98%    Weight:  52.2 kg (115 lb)     Height:  1.524 m (5')         Cardiac Rhythm:     Was the PSS-3 completed:   Yes  What interventions are required if any?               Family Comments:   OBS brochure/video discussed/provided to patient/family: N/A              Name of person given brochure if not patient:               Relationship to patient:     For the majority of the shift this patient's behavior was Green.   Behavioral interventions not required.    ED NURSE PHONE NUMBER: *[phone]

## 2022-09-27 NOTE — ED PROVIDER NOTES
History   Chief Complaint:  Fall and Hip Pain    The history is provided by the patient and the EMS personnel.      Shira Salas is a 89 year old female with history of osteoporosis who presents via EMS with fall and hip pain. EMS reports that the patient was wearing stockings when she slipped and fell in her bathroom onto her left hip. EMS shares that she did not hit her head or lose consciousness during the fall. No dizziness or lightheadedness prior to the fall. Patient denies neck pain. She notes her hip pain exacerbates with movement and she feels no pain at rest. Patient is not on anticoagulants.    Review of Systems   Musculoskeletal: Positive for arthralgias. Negative for neck pain.   Neurological: Negative for dizziness, syncope and light-headedness.   10 point review of systems performed and is negative except as above and in HPI.    Allergies:  Sulfa Drugs    Medications:  Patient is not taking any daily medication.     Past Medical History:     Primary open angle glaucoma  Pseudoexfoliation glaucoma  Glaucoma  Pseudophakia of both eyes  Hip pain  Back pain  Generalized body aches  Closed fracture of sacrum with routine healing  TMJ arthritis  Osteoporosis  Urge incontinence  Macular degeneration  Continuous leakage  Disorder of bone and cartilage  Postoperative nausea and vomiting  Cataract    Past Surgical History:    Cataract IOL, rt/lt  Glaucoma surgery  Hand surgery  Hysterectomy  Laser selective trabeculoplasty  rad resec tonsil/pillars  Arthroplasty  Tonsil and adenoidectomy  kpe IOL  Cataract removal   Left ahmed valve tube insertion glaucoma implant    Family History:    Mother - hypertension, ulcerative colitis  Father - heart disease, hypertension    Social History:  PCP: No Ref-Primary, Physician   Presents alone.  Presents via EMS.     Physical Exam     Patient Vitals for the past 24 hrs:   BP Temp Temp src Pulse Resp SpO2 Height Weight   09/26/22 2240 (!) 162/84 -- -- -- 20 -- -- --    09/26/22 1945 (!) 158/76 -- -- 77 -- 98 % -- --   09/26/22 1942 -- 98.1  F (36.7  C) Oral 77 -- 98 % 1.524 m (5') 52.2 kg (115 lb)   09/26/22 1912 -- -- -- -- 18 97 % -- --       Physical Exam  General: Resting on the gurney, appears uncomfortable  Head:  The scalp, face, and head appear normal  Neck:  No tenderness to palpation or with range of motion  Mouth/Throat: Mucus membranes are moist  CV:  Regular rate    Normal S1 and S2  No pathological murmur   Resp:  Breath sounds clear and equal bilaterally    Non-labored, no retractions or accessory muscle use    No coarseness    No wheezing   GI:  Abdomen is soft, no rigidity  MS:  Left hip quite tender to palpation    Right thigh moderately tender    Unable to range at the hip secondary to pain  Skin:  No rash or lesions noted.  Neuro:  Speech is normal and fluent. No apparent deficit.  Psych:  Awake. Alert.  Normal affect.      Appropriate interactions.    Emergency Department Course   Imaging:  MR Femur Thigh Left wo Contrast   Final Result   IMPRESSION:   1.  Nondisplaced left femoral intertrochanteric fracture.      2.  Nondisplaced fracture of the left anterior acetabulum/superior ramus.      3.  Probable sacral insufficiency fractures, incompletely assessed.      4.  Mild muscle strain versus reactive edema of the left gluteus doroteo, quadratus femoris, and adductor musculature.      XR Pelvis w Hip Left 1 View   Final Result   IMPRESSION: Osteopenia limits evaluation. Within this limitation, no definite acute displaced fracture. If there is persistent clinical concern, MRI is recommended for further evaluation. Mild degenerative changes of the hips. Mild degenerative changes    of the right knee. Degenerative changes of the lower lumbar spine and sacroiliac joints. Chronic bony changes of the left greater trochanter.      XR Femur Right 2 Views   Final Result   IMPRESSION: Osteopenia limits evaluation. Within this limitation, no definite acute displaced  fracture. If there is persistent clinical concern, MRI is recommended for further evaluation. Mild degenerative changes of the hips. Mild degenerative changes    of the right knee. Degenerative changes of the lower lumbar spine and sacroiliac joints. Chronic bony changes of the left greater trochanter.        Report per radiology    Laboratory:  Labs Ordered and Resulted from Time of ED Arrival to Time of ED Departure   BASIC METABOLIC PANEL - Abnormal       Result Value    Sodium 141      Potassium 4.0      Chloride 109      Carbon Dioxide (CO2) 23      Anion Gap 9      Urea Nitrogen 28      Creatinine 0.78      Calcium 8.7      Glucose 110 (*)     GFR Estimate 70     CBC WITH PLATELETS AND DIFFERENTIAL    WBC Count 8.1      RBC Count 4.07      Hemoglobin 12.5      Hematocrit 37.4      MCV 92      MCH 30.7      MCHC 33.4      RDW 13.1      Platelet Count 233      % Neutrophils 69      % Lymphocytes 16      % Monocytes 10      % Eosinophils 3      % Basophils 1      % Immature Granulocytes 1      NRBCs per 100 WBC 0      Absolute Neutrophils 5.7      Absolute Lymphocytes 1.3      Absolute Monocytes 0.8      Absolute Eosinophils 0.3      Absolute Basophils 0.1      Absolute Immature Granulocytes 0.0      Absolute NRBCs 0.0     COVID-19 VIRUS (CORONAVIRUS) BY PCR      Emergency Department Course:     Reviewed:  I reviewed nursing notes, vitals, past medical history and Care Everywhere    Assessments:  1916 I obtained history and examined the patient as noted above.   2100 I rechecked the patient and explained findings.   2335 I rechecked the patient and explained findings.     Consults:  1137 I consulted with Dr. Domínguez, Hospitalist, who accepts admission.    Interventions:  2243 Hydrocodone. 1 tablet. PO.     Disposition:  The patient was admitted to the hospital under the care of Dr. Domínguez.     Impression & Plan   Medical Decision Making:    Shira Salas is a 89 year old female who presents for evaluation  following a fall.  The fall was clearly mechanical in nature based on description, and no workup was undertaken for etiology, based on the patient's history.  Full examination revealed significant hip pain.  CMS is intact distally in the extremity.   X-Ray imaging was unremarkable, however, given her osteopenia and my concern based on exam MRI was obtained.  This showed a nondisplaced intertrochanteric fracture of the left femur that does not need reduction at this time. The patient understands that the need for reduction and/or surgery may change with time and orthopedic consultation. There is no indication for ortho consultation from the ED, but ortho will consult with the patient in the hospital.    In terms of head injury, the patient had no direct injury to the head, is not on any blood thinners, has no headache, and has a non dangerous mechanism, therefore no advanced imaging was undertaken.  In terms of neck injury, the patient is able to be cleared clinically.      The patients head to toe trauma exam is otherwise normal at this time and no further trauma workup is needed as I believe there is no signs of serious head, neck, chest, spinal, extremity or abdominal injuries. Dr. Domínguez accepts the patient for admission.    Diagnosis:    ICD-10-CM    1. Closed nondisplaced intertrochanteric fracture of left femur, initial encounter (H)  S72.145A    2. Fall, initial encounter  W19.XXXA      Scribe Disclosure:  Freya HODGE & Radha Vázquez, am serving as a scribe at 7:14 PM on 9/26/2022 to document services personally performed by Gia Caballero MD based on my observations and the provider's statements to me.          Gia Caballero MD  09/27/22 0108

## 2022-09-27 NOTE — ANESTHESIA CARE TRANSFER NOTE
Patient: Shira Salas    Procedure: Procedure(s):  INTERNAL FIXATION LEFT HIP USING INTRAMEDULLARY NAIL.       Diagnosis: Closed nondisplaced intertrochanteric fracture of left femur, initial encounter (H) [M88.087K]  Diagnosis Additional Information: No value filed.    Anesthesia Type:   General     Note:    Oropharynx: oropharynx clear of all foreign objects and spontaneously breathing  Level of Consciousness: awake  Oxygen Supplementation: nasal cannula  Level of Supplemental Oxygen (L/min / FiO2): 4  Independent Airway: airway patency satisfactory and stable  Dentition: dentition unchanged  Vital Signs Stable: post-procedure vital signs reviewed and stable  Report to RN Given: handoff report given  Patient transferred to: PACU    Handoff Report: Identifed the Patient, Identified the Reponsible Provider, Reviewed the pertinent medical history, Discussed the surgical course, Reviewed Intra-OP anesthesia mangement and issues during anesthesia, Set expectations for post-procedure period and Allowed opportunity for questions and acknowledgement of understanding      Vitals:  Vitals Value Taken Time   BP     Temp     Pulse     Resp     SpO2         Electronically Signed By: ESTER Leon CRNA  September 27, 2022  6:11 PM
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
## 2022-09-27 NOTE — OP NOTE
Procedure Date: 09/27/2022    PREOPERATIVE DIAGNOSIS:  Left intertrochanteric femur fracture.    POSTOPERATIVE DIAGNOSIS:  Left intertrochanteric femur fracture.    PROCEDURE:  Internal fixation of left intertrochanteric femur fracture with a Synthes trochanteric femoral nail.    SURGEON:  Ruddy Suárez MD    FIRST ASSISTANT:  Anitha Nma PA-C.    INDICATIONS FOR PROCEDURE:  Ms. Salas is a very pleasant 89-year-old female who fell at home, suffering and was unable to ambulate, came to [facility] where she was evaluated.  X-rays were negative initially, but MRI scan revealed a nondisplaced left intertrochanteric femur fracture as well as nondisplaced left pubic ramus and sacral ala fractures.  We discussed treatment options.  I recommended internal fixation of left intertrochanteric femur fracture and allowing the sacral and pubic fractures to heal conservatively.  She understands the risks, benefits and alternatives of this and wished to proceed with surgery.    NARRATIVE EVENTS:  After thorough evaluation and proper identification of the patient is going to be operated on, Ms. Salas was taken to the operating room where she underwent general anesthetics placed supine on the fracture table.  Left leg was prepped and draped in the usual sterile manner.  After appropriate surgical pause confirming the patient's extremity to be operated on, 10 minutes after received 2 grams of Ancef, her left hip was evaluated under biplanar fluoroscopy and it was in anatomic alignment.  So at this point, we then made a small lateral incision in line with the femur just proximal to the greater trochanter.  Skin and soft tissue sharply dissected down to the tensor fascia.  Fascia was then split in line with fiber line with femur, taken down, allowing us access down to the tip of the greater trochanter.  Here, we placed our pin for our entry reamer such that it was centered in the tibial greater trochanter on  the AP view, centered in line with the femur on the lateral view of the femur.  Once this was in position, we then over-reamed this with the initial entry reamer and then we passed our 11 mm x 130 mm Synthes short trochanteric nail down the femur.  Once this was into position, we made a small nick incision laterally along the femur for our lag screw.  We placed the guide pin for the lag screw such that it was centered in the femoral head in the AP and lateral views of the femur.  We measured and drilled and then measured and placed a 90 mm lag screw into position.  Once this was done, we then placed a distal interlocking screw distally through the aiming arm using another small lateral neck incision.  Once this was placed into position, we then thoroughly checked the position of the hardware using biplanar fluoroscopy, felt to be anatomic reduction and appropriate hardware placement.  We removed the aiming arm, thoroughly irrigated the wounds, closed them in layers with absorbable sutures and staples in the skin.  The patient was placed in a well-padded postoperative dressing, taken to recovery room in stable condition.  She will be weightbearing as tolerated with a walker for the next 6 weeks.  She is happy with this.  She tolerated the procedure without difficulty.    Ruddy Suárez MD        D: 2022   T: 2022   MT: HOANG    Name:     JUAN PETERSON  MRN:      [id number]        Account:        [id number]   :      1928           Procedure Date: 2022     Document: [id number]

## 2022-09-27 NOTE — ANESTHESIA PROCEDURE NOTES
Airway       Patient location during procedure: OR  Staff -        Anesthesiologist:  Pete Altman DO       CRNA: Karuna Brunson APRN CRNA       Performed By: CRNA  Consent for Airway        Urgency: elective  Indications and Patient Condition       Indications for airway management: black-procedural       Induction type:intravenous       Mask difficulty assessment: 1 - vent by mask    Final Airway Details       Final airway type: supraglottic airway    Supraglottic Airway Details        Type: LMA       Brand: Ambu AuraGain (attempted igel but did not fit well)       LMA size: 3    Post intubation assessment        Placement verified by: capnometry, equal breath sounds and chest rise        Number of attempts at approach: 1       Number of other approaches attempted: 0       Secured with: pink tape       Ease of procedure: easy       Dentition: Intact and Unchanged

## 2022-09-27 NOTE — ED NOTES
Bed: ED25  Expected date:   Expected time:   Means of arrival:   Comments:  Nico 534 89F fall, left hip pain

## 2022-09-28 ENCOUNTER — APPOINTMENT (OUTPATIENT)
Dept: PHYSICAL THERAPY | Facility: CLINIC | Age: 87
DRG: 956 | End: 2022-09-28
Attending: ORTHOPAEDIC SURGERY
Payer: COMMERCIAL

## 2022-09-28 LAB
CREAT SERPL-MCNC: 0.71 MG/DL (ref 0.52–1.04)
GFR SERPL CREATININE-BSD FRML MDRD: 79 ML/MIN/1.73M2
GLUCOSE BLD-MCNC: 122 MG/DL (ref 70–99)
HGB BLD-MCNC: 11.3 G/DL (ref 11.7–15.7)

## 2022-09-28 PROCEDURE — 120N000001 HC R&B MED SURG/OB

## 2022-09-28 PROCEDURE — 85018 HEMOGLOBIN: CPT | Performed by: ORTHOPAEDIC SURGERY

## 2022-09-28 PROCEDURE — 97530 THERAPEUTIC ACTIVITIES: CPT | Mod: GP

## 2022-09-28 PROCEDURE — 36415 COLL VENOUS BLD VENIPUNCTURE: CPT | Performed by: ORTHOPAEDIC SURGERY

## 2022-09-28 PROCEDURE — 99232 SBSQ HOSP IP/OBS MODERATE 35: CPT | Performed by: INTERNAL MEDICINE

## 2022-09-28 PROCEDURE — 250N000013 HC RX MED GY IP 250 OP 250 PS 637: Performed by: ORTHOPAEDIC SURGERY

## 2022-09-28 PROCEDURE — 258N000003 HC RX IP 258 OP 636: Performed by: ORTHOPAEDIC SURGERY

## 2022-09-28 PROCEDURE — 97161 PT EVAL LOW COMPLEX 20 MIN: CPT | Mod: GP

## 2022-09-28 PROCEDURE — 250N000011 HC RX IP 250 OP 636: Performed by: ORTHOPAEDIC SURGERY

## 2022-09-28 PROCEDURE — 82565 ASSAY OF CREATININE: CPT | Performed by: ORTHOPAEDIC SURGERY

## 2022-09-28 PROCEDURE — 82947 ASSAY GLUCOSE BLOOD QUANT: CPT | Performed by: INTERNAL MEDICINE

## 2022-09-28 RX ADMIN — ACETAMINOPHEN 975 MG: 325 TABLET, FILM COATED ORAL at 20:56

## 2022-09-28 RX ADMIN — POLYETHYLENE GLYCOL 3350 17 G: 17 POWDER, FOR SOLUTION ORAL at 09:12

## 2022-09-28 RX ADMIN — FAMOTIDINE 20 MG: 20 TABLET ORAL at 09:12

## 2022-09-28 RX ADMIN — HYDROXYZINE HYDROCHLORIDE 10 MG: 10 TABLET ORAL at 14:29

## 2022-09-28 RX ADMIN — CEFAZOLIN 1 G: 1 INJECTION, POWDER, FOR SOLUTION INTRAMUSCULAR; INTRAVENOUS at 01:03

## 2022-09-28 RX ADMIN — CEFAZOLIN 1 G: 1 INJECTION, POWDER, FOR SOLUTION INTRAMUSCULAR; INTRAVENOUS at 09:16

## 2022-09-28 RX ADMIN — ACETAMINOPHEN 975 MG: 325 TABLET, FILM COATED ORAL at 11:46

## 2022-09-28 RX ADMIN — SENNOSIDES AND DOCUSATE SODIUM 1 TABLET: 50; 8.6 TABLET ORAL at 09:12

## 2022-09-28 RX ADMIN — MELATONIN TAB 3 MG 3 MG: 3 TAB at 20:56

## 2022-09-28 RX ADMIN — ACETAMINOPHEN 975 MG: 325 TABLET, FILM COATED ORAL at 04:23

## 2022-09-28 RX ADMIN — ENOXAPARIN SODIUM 40 MG: 40 INJECTION SUBCUTANEOUS at 11:46

## 2022-09-28 RX ADMIN — OXYCODONE HYDROCHLORIDE 2.5 MG: 5 TABLET ORAL at 14:29

## 2022-09-28 RX ADMIN — SENNOSIDES AND DOCUSATE SODIUM 1 TABLET: 50; 8.6 TABLET ORAL at 20:56

## 2022-09-28 RX ADMIN — SODIUM CHLORIDE, POTASSIUM CHLORIDE, SODIUM LACTATE AND CALCIUM CHLORIDE: 600; 310; 30; 20 INJECTION, SOLUTION INTRAVENOUS at 10:56

## 2022-09-28 ASSESSMENT — ACTIVITIES OF DAILY LIVING (ADL)
ADLS_ACUITY_SCORE: 36
ADLS_ACUITY_SCORE: 30
ADLS_ACUITY_SCORE: 30
ADLS_ACUITY_SCORE: 36
ADLS_ACUITY_SCORE: 30
ADLS_ACUITY_SCORE: 32
ADLS_ACUITY_SCORE: 32
ADLS_ACUITY_SCORE: 36
ADLS_ACUITY_SCORE: 30

## 2022-09-28 NOTE — PROGRESS NOTES
HOSPITALIST PROGRESS NOTE    Patient is a 89 yo female with left femoral intertrochanteric fracture, left acetabulum/superior ramus fracture 2/2 mechanical fall. She underwent internal fixation of left intertrochanteric femur fracture with Synthes trochanteric femoral nail. Hospitalist service was consulted by Orthopedics for medical management post-op.    PLAN:  -Hospitalist service is the primary service managing the patient  -Consult discontinued due to above    Eli Miller NP  [facility]  Securely message with the Vocera Web Console (learn more here)  Text page via [facility] Paging/Directory

## 2022-09-28 NOTE — PROGRESS NOTES
Orthopedic Surgery  Shira Salas  09/28/2022     Admit Date:  9/26/2022  POD: 1 Day Post-Op   Procedure(s):  INTERNAL FIXATION LEFT HIP USING INTRAMEDULLARY NAIL.    Alert and oriented.   Patient resting comfortably in bed.    Pain controlled.  Tolerating oral intake.    Denies nausea or vomiting  Denies chest pain or shortness of breath    Temp:  [95.9  F (35.5  C)-98.5  F (36.9  C)] 95.9  F (35.5  C)  Pulse:  [70-88] 81  Resp:  [9-20] 16  BP: (114-173)/(60-99) 114/60  SpO2:  [94 %-100 %] 98 %    Dressing is clean, dry, and intact.   Minimal erythema of the surrounding skin.   Bilateral calves are soft, non-tender.  Left lower extremity is NVI.  Sensation intact bilateral lower extremities  Patient able to resist dorsi and plantar flexion bilaterally  +Dp pulse    Labs:  Recent Labs   Lab Test 09/28/22  0758 09/26/22  1946 08/17/22  1344   WBC  --  8.1 9.5   HGB 11.3* 12.5 12.7   PLT  --  233 239       1. PLAN:   Continue Lovenox for DVT prophylaxis.     Mobilize with PT/OT    WBAT Left LE with walker.     Continue current pain regiment.   Dressings: Keep intact.  Change if >60% saturated or peeling off.    Follow-up: 2 weeks post-op with Dr Suárez team     2. Disposition   Anticipate d/c to TCU 1-2 days when medically cleared and progressing in PT.    Cheri Castillo PA-C
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
## 2022-09-28 NOTE — PLAN OF CARE
Goal Outcome Evaluation:    Plan of Care Reviewed With: patient     Overall Patient Progress: improving    Outcome Evaluation: Discharge pending TCU placement    Patient vital signs are at baseline: Yes  Patient able to ambulate as they were prior to admission or with assist devices provided by therapies during their stay:  No,  Reason:  Unable to stand w/ assist.  Awaiting PT.  Patient MUST void prior to discharge:  Yes  Voiding via PureWick  Patient able to tolerate oral intake:  Yes  Pain has adequate pain control using Oral analgesics:  Yes  Does patient have an identified :  Yes  Has goal D/C date and time been discussed with patient:  Yes  Discharge pending TCU placement.  Family aware.    A&O x1 to Person Only. Forgetful.   CMS Intact.   VSS on RA.   Awaiting PT.   Voiding via PureWick.   Pain controlled with Oxycodone,, Tylenol.   Continue to monitor.
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
## 2022-09-28 NOTE — CONSULTS
Care Management Initial Consult    General Information  Assessment completed with: Patient, Children, Daughter Alana  Type of CM/SW Visit: Offer D/C Planning    Primary Care Provider verified and updated as needed:     Readmission within the last 30 days:        Reason for Consult: discharge planning  Advance Care Planning:            Communication Assessment  Patient's communication style: spoken language (English or Bilingual)    Hearing Difficulty or Deaf: yes ([place])   Wear Glasses or Blind: no    Cognitive  Cognitive/Neuro/Behavioral: .WDL except  Level of Consciousness: confused  Arousal Level: arouses to voice  Orientation: disoriented to, situation, place, time  Mood/Behavior: calm, cooperative     Speech: slow    Living Environment:   People in home: alone, facility resident     Current living Arrangements: apartment      Able to return to prior arrangements: yes       Family/Social Support:  Care provided by: self  Provides care for: no one  Marital Status:              Description of Support System: Supportive, Involved         Current Resources:   Patient receiving home care services: No     Community Resources: None  Equipment currently used at home: walker, rolling  Supplies currently used at home:      Employment/Financial:  Employment Status: retired        Financial Concerns:             Lifestyle & Psychosocial Needs:  Social Determinants of Health     Tobacco Use: Low Risk      Smoking Tobacco Use: Never Smoker     Smokeless Tobacco Use: Never Used   Alcohol Use: Not on file   Financial Resource Strain: Not on file   Food Insecurity: Not on file   Transportation Needs: Not on file   Physical Activity: Not on file   Stress: Not on file   Social Connections: Not on file   Intimate Partner Violence: Not on file   Depression: Not at risk     PHQ-2 Score: 0   Housing Stability: Not on file       Functional Status:  Prior to admission patient needed assistance:              Mental Health Status:           Chemical Dependency Status:                Values/Beliefs:  Spiritual, Cultural Beliefs, Islam Practices, Values that affect care:                 Additional Information:  Met with patient and daughter to discuss role in discharge planning.  Pt lives indep at [facility] apartment at [facility].  Pt had no services in the home. Pt was recently discharge from TCU ([facility]) and was due to move to [facility] on Monday.  Family feels Pt will need TCU again    Pt/family was provided with the Medicare Compare list for SNF.  Discussed associated Medicare star ratings to assist with choice for referrals/discharge planning Yes    Education was given to pt/family that star ratings are updated/maintained by Medicare and can be reviewed by visiting www.medicare.gov Yes    Referral sent to [facility] via [facility]  Family feels they can transport at discharge.   Sw updated.    Addendum 1400:  SW updated that patient was receiving HHC through [employer].     CC/Sw to follow for discharge planning.     Jayla Warren RN

## 2022-09-28 NOTE — PROGRESS NOTES
Patient vital signs are at baseline: Yes  Patient able to ambulate as they were prior to admission or with assist devices provided by therapies during their stay:  No, sat at bed side multiple times  Patient MUST void prior to discharge:  Yes via purewick  Patient able to tolerate oral intake:  Yes  Pain has adequate pain control using Oral analgesics:  Yes  Does patient have an identified :  Yes  Has goal D/C date and time been discussed with patient:  Yes     Patient is alert to self, disoriented time, place and situation. Confused and forgetful. Attempt to self transfer multiple time. Easy to redirect. CMS intact.

## 2022-09-29 LAB
ANION GAP SERPL CALCULATED.3IONS-SCNC: 5 MMOL/L (ref 3–14)
BUN SERPL-MCNC: 25 MG/DL (ref 7–30)
CALCIUM SERPL-MCNC: 8.3 MG/DL (ref 8.5–10.1)
CHLORIDE BLD-SCNC: 109 MMOL/L (ref 94–109)
CO2 SERPL-SCNC: 24 MMOL/L (ref 20–32)
CREAT SERPL-MCNC: 0.84 MG/DL (ref 0.52–1.04)
GFR SERPL CREATININE-BSD FRML MDRD: 64 ML/MIN/1.73M2
GLUCOSE BLD-MCNC: 98 MG/DL (ref 70–99)
GLUCOSE BLD-MCNC: 98 MG/DL (ref 70–99)
HGB BLD-MCNC: 11 G/DL (ref 11.7–15.7)
POTASSIUM BLD-SCNC: 3.7 MMOL/L (ref 3.4–5.3)
SODIUM SERPL-SCNC: 138 MMOL/L (ref 133–144)

## 2022-09-29 PROCEDURE — 250N000011 HC RX IP 250 OP 636: Performed by: ORTHOPAEDIC SURGERY

## 2022-09-29 PROCEDURE — 36415 COLL VENOUS BLD VENIPUNCTURE: CPT | Performed by: INTERNAL MEDICINE

## 2022-09-29 PROCEDURE — 99232 SBSQ HOSP IP/OBS MODERATE 35: CPT | Performed by: STUDENT IN AN ORGANIZED HEALTH CARE EDUCATION/TRAINING PROGRAM

## 2022-09-29 PROCEDURE — 80048 BASIC METABOLIC PNL TOTAL CA: CPT | Performed by: INTERNAL MEDICINE

## 2022-09-29 PROCEDURE — 250N000013 HC RX MED GY IP 250 OP 250 PS 637: Performed by: ORTHOPAEDIC SURGERY

## 2022-09-29 PROCEDURE — 85018 HEMOGLOBIN: CPT | Performed by: ORTHOPAEDIC SURGERY

## 2022-09-29 PROCEDURE — 999N000111 HC STATISTIC OT IP EVAL DEFER: Performed by: OCCUPATIONAL THERAPIST

## 2022-09-29 PROCEDURE — 120N000001 HC R&B MED SURG/OB

## 2022-09-29 RX ORDER — OXYCODONE HYDROCHLORIDE 5 MG/1
2.5 TABLET ORAL EVERY 4 HOURS PRN
Qty: 15 TABLET | Refills: 0 | Status: SHIPPED | OUTPATIENT
Start: 2022-09-29

## 2022-09-29 RX ADMIN — ACETAMINOPHEN 975 MG: 325 TABLET, FILM COATED ORAL at 04:38

## 2022-09-29 RX ADMIN — ACETAMINOPHEN 975 MG: 325 TABLET, FILM COATED ORAL at 12:38

## 2022-09-29 RX ADMIN — ENOXAPARIN SODIUM 40 MG: 40 INJECTION SUBCUTANEOUS at 12:39

## 2022-09-29 RX ADMIN — POLYETHYLENE GLYCOL 3350 17 G: 17 POWDER, FOR SOLUTION ORAL at 10:26

## 2022-09-29 RX ADMIN — SENNOSIDES AND DOCUSATE SODIUM 1 TABLET: 50; 8.6 TABLET ORAL at 10:25

## 2022-09-29 RX ADMIN — FAMOTIDINE 20 MG: 20 TABLET ORAL at 10:26

## 2022-09-29 RX ADMIN — SENNOSIDES AND DOCUSATE SODIUM 1 TABLET: 50; 8.6 TABLET ORAL at 20:28

## 2022-09-29 RX ADMIN — ACETAMINOPHEN 975 MG: 325 TABLET, FILM COATED ORAL at 20:28

## 2022-09-29 ASSESSMENT — ACTIVITIES OF DAILY LIVING (ADL)
ADLS_ACUITY_SCORE: 32
ADLS_ACUITY_SCORE: 36
ADLS_ACUITY_SCORE: 32
ADLS_ACUITY_SCORE: 36
ADLS_ACUITY_SCORE: 32
ADLS_ACUITY_SCORE: 32
ADLS_ACUITY_SCORE: 36

## 2022-09-29 NOTE — PLAN OF CARE
OT: Orders received. Chart reviewed and discussed with care team.  OT not indicated due to:Pt. s/p hip surgery, discharging to TCU per chart, clarified w/ nursing. In order to best utilize OT services, will defer to TCU setting.  Defer discharge recommendations to medical/ortho team/PT.  Will complete orders.

## 2022-09-29 NOTE — PROGRESS NOTES
Care Transitions Note:  [employer] Auth initiated by phone  Ref ID [id number]  Clinical notes faxed to [phone]

## 2022-09-29 NOTE — PROGRESS NOTES
Orthopedic Surgery  Shira Salas  09/29/2022     Admit Date:  9/26/2022  POD: 2 Days Post-Op   Procedure(s):  INTERNAL FIXATION LEFT HIP USING INTRAMEDULLARY NAIL.    Alert and oriented.   Patient resting comfortably in bed.    Pain controlled.  Tolerating oral intake.    Denies nausea or vomiting  Denies chest pain or shortness of breath    Temp:  [97.6  F (36.4  C)-98.6  F (37  C)] 97.6  F (36.4  C)  Pulse:  [77-79] 79  Resp:  [16] 16  BP: (111-127)/(60-76) 127/76  SpO2:  [97 %-98 %] 98 %    Dressing is clean, dry, and intact.   Minimal erythema of the surrounding skin.   Bilateral calves are soft, non-tender.  Left lower extremity is NVI.  Sensation intact bilateral lower extremities  Patient able to resist dorsi and plantar flexion bilaterally  +Dp pulse    Labs:  Recent Labs   Lab Test 09/29/22  0800 09/28/22  0758 09/26/22  1946 08/17/22  1344   WBC  --   --  8.1 9.5   HGB 11.0* 11.3* 12.5 12.7   PLT  --   --  233 239       1. PLAN:   Continue Lovenox for DVT prophylaxis.     Mobilize with PT/OT    WBAT Left LE with walker.     Continue current pain regiment.   Dressings: Keep intact.  Change if >60% saturated or peeling off.    Follow-up: 2 weeks post-op with Dr Suárez team     2. Disposition   Anticipate d/c to TCU 1-2 days when medically cleared and progressing in PT.    Cheri Castillo PA-C

## 2022-09-29 NOTE — PROGRESS NOTES
Date/Time:2300-0730    Trauma/Ortho/Medical (Choose one) :Trauma    Diagnosis:Left femoral intertrochanteric fracture, s/p internal fixation with femoral nail on 9/27/22  POD#:2  Mental Status:Alert and oriented to self  Activity/dangle:A-1 with walker GB  Diet:Reg  Pain:Managed with scheduled Tylenol.  Ybarra/Voiding:Incontinent,brief changed  Tele/Restraints/Iso:None  02/LDA:RA  D/C Date:1-2 days to TCU  Other Info:PIV SL  Dressing marked,continue to monitor.

## 2022-09-29 NOTE — PLAN OF CARE
Goal Outcome Evaluation:    Patient is alert to self, confused, restless @ times.  Up with assist of 1-2/gb/walker. Denies chest pain or SOB. Pain managed with tylenol. Voiding per bathroom, incontinent @ times. Will continue to monitor.
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
## 2022-09-29 NOTE — PROGRESS NOTES
/51 (BP Location: Left arm)   Pulse 79   Temp 97.9  F (36.6  C) (Oral)   Resp 16   Ht 1.524 m (5')   Wt 52.2 kg (115 lb)   LMP  (LMP Unknown)   SpO2 95%   BMI 22.46 kg/m   .    Assessment: Patient is alert and oriented x4. Vital signs stable. Left hip incision covered with dressing. Drainage marked. Purewick in place, very small bowel movement in bedside commode. Bowel meds given for constipation.     Pain management: Patient denies pain, refused needing tylenol or ice pack.     Interventions this shift: incontinence care    Goals for next shift: assess for pain, monitor left hip    Madonna De La O RN on 9/29/2022 at 6:39 PM

## 2022-09-30 VITALS
DIASTOLIC BLOOD PRESSURE: 73 MMHG | WEIGHT: 115 LBS | OXYGEN SATURATION: 95 % | BODY MASS INDEX: 22.58 KG/M2 | RESPIRATION RATE: 18 BRPM | SYSTOLIC BLOOD PRESSURE: 157 MMHG | TEMPERATURE: 98.4 F | HEART RATE: 82 BPM | HEIGHT: 60 IN

## 2022-09-30 LAB — PLATELET # BLD AUTO: 232 10E3/UL (ref 150–450)

## 2022-09-30 PROCEDURE — 250N000013 HC RX MED GY IP 250 OP 250 PS 637: Performed by: ORTHOPAEDIC SURGERY

## 2022-09-30 PROCEDURE — 85049 AUTOMATED PLATELET COUNT: CPT | Performed by: ORTHOPAEDIC SURGERY

## 2022-09-30 PROCEDURE — 99239 HOSP IP/OBS DSCHRG MGMT >30: CPT | Performed by: STUDENT IN AN ORGANIZED HEALTH CARE EDUCATION/TRAINING PROGRAM

## 2022-09-30 PROCEDURE — 250N000011 HC RX IP 250 OP 636: Performed by: ORTHOPAEDIC SURGERY

## 2022-09-30 PROCEDURE — 36415 COLL VENOUS BLD VENIPUNCTURE: CPT | Performed by: ORTHOPAEDIC SURGERY

## 2022-09-30 RX ADMIN — FAMOTIDINE 20 MG: 20 TABLET ORAL at 08:44

## 2022-09-30 RX ADMIN — ACETAMINOPHEN 975 MG: 325 TABLET, FILM COATED ORAL at 03:47

## 2022-09-30 RX ADMIN — ACETAMINOPHEN 975 MG: 325 TABLET, FILM COATED ORAL at 12:15

## 2022-09-30 RX ADMIN — ENOXAPARIN SODIUM 40 MG: 40 INJECTION SUBCUTANEOUS at 12:15

## 2022-09-30 RX ADMIN — SENNOSIDES AND DOCUSATE SODIUM 1 TABLET: 50; 8.6 TABLET ORAL at 08:44

## 2022-09-30 RX ADMIN — POLYETHYLENE GLYCOL 3350 17 G: 17 POWDER, FOR SOLUTION ORAL at 08:45

## 2022-09-30 ASSESSMENT — ACTIVITIES OF DAILY LIVING (ADL)
ADLS_ACUITY_SCORE: 32
ADLS_ACUITY_SCORE: 36
ADLS_ACUITY_SCORE: 32

## 2022-09-30 NOTE — PROGRESS NOTES
Care Management Follow Up    Length of Stay (days): 4    Expected Discharge Date: 09/30/2022     Concerns to be Addressed:       Patient plan of care discussed at interdisciplinary rounds: Yes    Anticipated Discharge Disposition: Transitional Care     Anticipated Discharge Services:    Anticipated Discharge DME:      Patient/family educated on Medicare website which has current facility and service quality ratings:    Education Provided on the Discharge Plan:    Patient/Family in Agreement with the Plan: yes    Referrals Placed by CM/SW: Post Acute Facilities  Private pay costs discussed: Not applicable    Additional Information:  Call to Torrie and miguelito received. Authorization [id number] from 9/30/22-10/6/22.     Writer updated patient and son in patient room. Family still plans to transport patient at discharge. Writer discussed transprot time no earlier than 1400. Son in agreement. Writer also confirmed with son that facility has $36/day private room fee that is waived for first 14 days. Son in agreement. Call to facility to update and in agreement. PAS completed.     PAS-RR    D: Per DHS regulation, SW completed and submitted PAS-RR to [place] [employer]ging Direct Connect via the Senior LinkAge Line.  PAS-RR confirmation # is : [id number]    I: SW spoke with patient and they are aware a PAS-RR has been submitted.  SW reviewed with patient that they may be contacted for a follow up appointment within 10 days of hospital discharge if their SNF stay is < 30 days.  Contact information for Senior LinkAge Line was also provided.    A: patient verbalized understanding.    P: Further questions may be directed to [facility] LinkAge Line at #[phone], option #4 for PAS-RR staff.    BALTAZAR Brown
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
## 2022-09-30 NOTE — PLAN OF CARE
Goal Outcome Evaluation:    Patient vital signs are a t baseline: Yes  Patient able to ambulate as they were prior to admission or with assist devices provided by therapies during their stay:  Yes  Patient MUST void prior to discharge:  Yes  Patient able to tolerate oral intake:  Yes  Pain has adequate pain control using Oral analgesics:  Yes  Does patient have an identified :  Yes  Has goal D/C date and time been discussed with patient:  Yes pt discharged to TCU.

## 2022-09-30 NOTE — PLAN OF CARE
Goal Outcome Evaluation:    Plan of Care Reviewed With: patient     Overall Patient Progress: improving    Date/Time: 9/29 3-11:30 shift    Trauma/Ortho/Medical (Choose one) Ortho    Diagnosis: R hip nailing after fall and fracture  POD#: 2  Mental Status: A&O x4, forgetful  Activity/dangle: Ax1 with gait belt and walker  Diet: Regular  Pain: Denies but has difficulty with movement  Ybarra/Voiding: Using purewick.  Educated on importance of getting up to move around. Did get up to use commode for BM  but otherwise wanted to keep purewick.   Tele/Restraints/Iso: No  02/LDA: PIV  D/C Date:  To discharge to TCU tomorrow

## 2022-09-30 NOTE — PLAN OF CARE
Pt A&Ox4; VSS on RA except elevated BP. Denies pain or SOB this shift. Dressing on L hip marked; no changes. Purewick in place; good urine output noted. Slept most of this shift. Possible discharge to TCU today.

## 2022-09-30 NOTE — PLAN OF CARE
Physical Therapy Discharge Summary    Reason for therapy discharge:    Discharged to transitional care facility.    Progress towards therapy goal(s). See goals on Care Plan in [facility] electronic health record for goal details.  Goals not met.  Barriers to achieving goals:   discharge from facility.    Therapy recommendation(s):    Continued therapy is recommended.  Rationale/Recommendations:  To progress independence and safety with functional mobility.

## 2022-10-03 ENCOUNTER — PATIENT OUTREACH (OUTPATIENT)
Dept: CARE COORDINATION | Facility: CLINIC | Age: 87
End: 2022-10-03

## 2022-10-03 NOTE — PROGRESS NOTES
[facility]    Background: Transitional Care Management program auto-identified and prompting a chart review by [facility] [place] team.    Assessment: Upon chart review, CCR Team member will cancel/close this episode of Transitional Care Management program due to reason below:    Patient is not established within [facility] and CCR team member noted patient discharged to TCU/ARU/LTACH.    Plan: Transitional Care Management episode closed per reason above.      DANTE Cunningham  [facility] [place], [facility]    *Connected Care Resource Team does NOT follow patient ongoing. Referrals are identified based on internal discharge reports and the outreach is to ensure patient has an understanding of their discharge instructions.

## 2022-12-06 NOTE — PLAN OF CARE
"Physical Therapy Discharge Summary    Reason for therapy discharge:    Discharged to transitional care facility.    Progress towards therapy goal(s). See goals on Care Plan in [facility] electronic health record for goal details.  Goals not met.  Barriers to achieving goals:   discharge from facility.    Therapy recommendation(s):    Continued therapy is recommended.  Rationale/Recommendations:  Per PT notes, \"Pt is below baseline. Pt currently requiring assist with all functional mobility. Pt presents with deficits in activity tolerance, balance, and strength. Due to these deficits, pt is a high falls risk and unsafe to return home alone. Pt would benefit from continued skilled PT services via TCU to address deficits and improve IND with safety and functional mobility. If pt were to return home, pt would require 24/7 A x 1 with all functional mobility and ADLs, along with HHPT..\"      " oriented to person, place and time , normal sensation , short and long term memory intact

## 2023-07-06 ENCOUNTER — APPOINTMENT (OUTPATIENT)
Dept: CT IMAGING | Facility: CLINIC | Age: 88
End: 2023-07-06
Attending: EMERGENCY MEDICINE
Payer: COMMERCIAL

## 2023-07-06 ENCOUNTER — HOSPITAL ENCOUNTER (EMERGENCY)
Facility: CLINIC | Age: 88
Discharge: HOME | End: 2023-07-06
Attending: EMERGENCY MEDICINE | Admitting: EMERGENCY MEDICINE
Payer: COMMERCIAL

## 2023-07-06 VITALS
OXYGEN SATURATION: 98 % | RESPIRATION RATE: 18 BRPM | DIASTOLIC BLOOD PRESSURE: 83 MMHG | SYSTOLIC BLOOD PRESSURE: 186 MMHG | HEART RATE: 83 BPM | TEMPERATURE: 97.3 F

## 2023-07-06 DIAGNOSIS — S81.811A LACERATION OF RIGHT LOWER LEG, INITIAL ENCOUNTER: ICD-10-CM

## 2023-07-06 DIAGNOSIS — S00.83XA FACIAL CONTUSION, INITIAL ENCOUNTER: ICD-10-CM
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
## 2023-07-06 PROCEDURE — 70450 CT HEAD/BRAIN W/O DYE: CPT

## 2023-07-06 PROCEDURE — 12004 RPR S/N/AX/GEN/TRK7.6-12.5CM: CPT

## 2023-07-06 PROCEDURE — 99285 EMERGENCY DEPT VISIT HI MDM: CPT | Mod: 25

## 2023-07-06 PROCEDURE — 99284 EMERGENCY DEPT VISIT MOD MDM: CPT | Mod: 25

## 2023-07-06 PROCEDURE — 70486 CT MAXILLOFACIAL W/O DYE: CPT

## 2023-07-06 PROCEDURE — 72125 CT NECK SPINE W/O DYE: CPT

## 2023-07-06 RX ORDER — ACETAMINOPHEN 325 MG/1
650 TABLET ORAL ONCE
Status: DISCONTINUED | OUTPATIENT
Start: 2023-07-06 | End: 2023-07-06 | Stop reason: HOSPADM

## 2023-07-06 ASSESSMENT — ACTIVITIES OF DAILY LIVING (ADL)
ADLS_ACUITY_SCORE: 35
ADLS_ACUITY_SCORE: 35
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
## 2023-09-03 ENCOUNTER — APPOINTMENT (OUTPATIENT)
Dept: GENERAL RADIOLOGY | Facility: CLINIC | Age: 88
End: 2023-09-03
Attending: EMERGENCY MEDICINE
Payer: COMMERCIAL

## 2023-09-03 ENCOUNTER — HOSPITAL ENCOUNTER (EMERGENCY)
Facility: CLINIC | Age: 88
Discharge: HOME | End: 2023-09-03
Attending: EMERGENCY MEDICINE | Admitting: EMERGENCY MEDICINE
Payer: COMMERCIAL

## 2023-09-03 ENCOUNTER — APPOINTMENT (OUTPATIENT)
Dept: CT IMAGING | Facility: CLINIC | Age: 88
End: 2023-09-03
Attending: EMERGENCY MEDICINE
Payer: COMMERCIAL

## 2023-09-03 VITALS
DIASTOLIC BLOOD PRESSURE: 86 MMHG | RESPIRATION RATE: 15 BRPM | SYSTOLIC BLOOD PRESSURE: 125 MMHG | TEMPERATURE: 97.5 F | HEIGHT: 60 IN | WEIGHT: 105 LBS | HEART RATE: 80 BPM | OXYGEN SATURATION: 97 % | BODY MASS INDEX: 20.62 KG/M2

## 2023-09-03 DIAGNOSIS — S06.0X9A CONCUSSION WITH LOSS OF CONSCIOUSNESS, INITIAL ENCOUNTER: ICD-10-CM

## 2023-09-03 DIAGNOSIS — S09.90XA CLOSED HEAD INJURY, INITIAL ENCOUNTER: ICD-10-CM

## 2023-09-03 DIAGNOSIS — T14.8XXA HEMATOMA OF SKIN: ICD-10-CM

## 2023-09-03 LAB
ALBUMIN UR-MCNC: NEGATIVE MG/DL
ANION GAP SERPL CALCULATED.3IONS-SCNC: 11 MMOL/L (ref 7–15)
APPEARANCE UR: CLEAR
ATRIAL RATE - MUSE: 69 BPM
BASOPHILS # BLD AUTO: 0.1 10E3/UL (ref 0–0.2)
BASOPHILS NFR BLD AUTO: 1 %
BILIRUB UR QL STRIP: NEGATIVE
BUN SERPL-MCNC: 21.2 MG/DL (ref 8–23)
CALCIUM SERPL-MCNC: 8.7 MG/DL (ref 8.2–9.6)
CHLORIDE SERPL-SCNC: 105 MMOL/L (ref 98–107)
COLOR UR AUTO: ABNORMAL
CREAT SERPL-MCNC: 0.85 MG/DL (ref 0.51–0.95)
DEPRECATED HCO3 PLAS-SCNC: 21 MMOL/L (ref 22–29)
DIASTOLIC BLOOD PRESSURE - MUSE: NORMAL MMHG
EOSINOPHIL # BLD AUTO: 0.1 10E3/UL (ref 0–0.7)
EOSINOPHIL NFR BLD AUTO: 1 %
ERYTHROCYTE [DISTWIDTH] IN BLOOD BY AUTOMATED COUNT: 13.2 % (ref 10–15)
GFR SERPL CREATININE-BSD FRML MDRD: 63 ML/MIN/1.73M2
GLUCOSE SERPL-MCNC: 125 MG/DL (ref 70–99)
GLUCOSE UR STRIP-MCNC: NEGATIVE MG/DL
HCT VFR BLD AUTO: 38.7 % (ref 35–47)
HGB BLD-MCNC: 12.8 G/DL (ref 11.7–15.7)
HGB UR QL STRIP: NEGATIVE
HOLD SPECIMEN: NORMAL
IMM GRANULOCYTES # BLD: 0.1 10E3/UL
IMM GRANULOCYTES NFR BLD: 1 %
INTERPRETATION ECG - MUSE: NORMAL
KETONES UR STRIP-MCNC: NEGATIVE MG/DL
LEUKOCYTE ESTERASE UR QL STRIP: NEGATIVE
LYMPHOCYTES # BLD AUTO: 1.1 10E3/UL (ref 0.8–5.3)
LYMPHOCYTES NFR BLD AUTO: 11 %
MCH RBC QN AUTO: 30.6 PG (ref 26.5–33)
MCHC RBC AUTO-ENTMCNC: 33.1 G/DL (ref 31.5–36.5)
MCV RBC AUTO: 93 FL (ref 78–100)
MONOCYTES # BLD AUTO: 0.8 10E3/UL (ref 0–1.3)
MONOCYTES NFR BLD AUTO: 8 %
NEUTROPHILS # BLD AUTO: 7.8 10E3/UL (ref 1.6–8.3)
NEUTROPHILS NFR BLD AUTO: 78 %
NITRATE UR QL: NEGATIVE
NRBC # BLD AUTO: 0 10E3/UL
NRBC BLD AUTO-RTO: 0 /100
P AXIS - MUSE: 51 DEGREES
PH UR STRIP: 7.5 [PH] (ref 5–7)
PLATELET # BLD AUTO: 187 10E3/UL (ref 150–450)
POTASSIUM SERPL-SCNC: 4.9 MMOL/L (ref 3.4–5.3)
PR INTERVAL - MUSE: 186 MS
QRS DURATION - MUSE: 88 MS
QT - MUSE: 424 MS
QTC - MUSE: 454 MS
R AXIS - MUSE: 16 DEGREES
RBC # BLD AUTO: 4.18 10E6/UL (ref 3.8–5.2)
SODIUM SERPL-SCNC: 137 MMOL/L (ref 136–145)
SP GR UR STRIP: 1.01 (ref 1–1.03)
SYSTOLIC BLOOD PRESSURE - MUSE: NORMAL MMHG
T AXIS - MUSE: 39 DEGREES
TROPONIN T SERPL HS-MCNC: 21 NG/L
TROPONIN T SERPL HS-MCNC: 23 NG/L
UROBILINOGEN UR STRIP-MCNC: NORMAL MG/DL
VENTRICULAR RATE- MUSE: 69 BPM
WBC # BLD AUTO: 9.8 10E3/UL (ref 4–11)

## 2023-09-03 PROCEDURE — 71046 X-RAY EXAM CHEST 2 VIEWS: CPT

## 2023-09-03 PROCEDURE — 36415 COLL VENOUS BLD VENIPUNCTURE: CPT | Performed by: EMERGENCY MEDICINE

## 2023-09-03 PROCEDURE — 72125 CT NECK SPINE W/O DYE: CPT

## 2023-09-03 PROCEDURE — 70450 CT HEAD/BRAIN W/O DYE: CPT

## 2023-09-03 PROCEDURE — 81003 URINALYSIS AUTO W/O SCOPE: CPT | Performed by: EMERGENCY MEDICINE

## 2023-09-03 PROCEDURE — 93005 ELECTROCARDIOGRAM TRACING: CPT

## 2023-09-03 PROCEDURE — 80048 BASIC METABOLIC PNL TOTAL CA: CPT | Performed by: EMERGENCY MEDICINE

## 2023-09-03 PROCEDURE — 99285 EMERGENCY DEPT VISIT HI MDM: CPT | Mod: 25

## 2023-09-03 PROCEDURE — 85025 COMPLETE CBC W/AUTO DIFF WBC: CPT | Performed by: EMERGENCY MEDICINE

## 2023-09-03 PROCEDURE — 84484 ASSAY OF TROPONIN QUANT: CPT | Performed by: EMERGENCY MEDICINE

## 2023-09-03 ASSESSMENT — ACTIVITIES OF DAILY LIVING (ADL)
ADLS_ACUITY_SCORE: 35
ADLS_ACUITY_SCORE: 35

## 2023-09-03 NOTE — ED NOTES
Bed: ED02  Expected date:   Expected time:   Means of arrival:   Comments:  Nico [address] 89 F fall hit head now weak eta 0996

## 2023-09-03 NOTE — ED TRIAGE NOTES
Patient comes in from [facility] independent living.  She had a mechanical fall yesterday per staff.  She has since been more sleepy. Alert and oriented x3, doesn't know the year.  Large bruise above right eye.  DNR per their facesheet.

## 2023-09-03 NOTE — DISCHARGE INSTRUCTIONS
Please return to the emergency department if your symptoms increase, you experience an increase in pain, uncontrollable nausea or vomiting, facial droop, slurred speech, unilateral numbness or weakness in your extremities.    Please follow-up with your primary care provider next week.

## 2023-09-03 NOTE — ED NOTES
Called and spoke with [facility].  Updated them of patients status and discharge.  Patients son Heriberto will drive her back to the facility.
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
## 2023-11-29 ENCOUNTER — LAB REQUISITION (OUTPATIENT)
Dept: LAB | Facility: CLINIC | Age: 88
End: 2023-11-29
Payer: COMMERCIAL

## 2023-11-29 DIAGNOSIS — N39.0 URINARY TRACT INFECTION, SITE NOT SPECIFIED: ICD-10-CM

## 2023-11-29 LAB
ALBUMIN UR-MCNC: NEGATIVE MG/DL
APPEARANCE UR: ABNORMAL
BILIRUB UR QL STRIP: NEGATIVE
COLOR UR AUTO: ABNORMAL
GLUCOSE UR STRIP-MCNC: NEGATIVE MG/DL
HGB UR QL STRIP: ABNORMAL
KETONES UR STRIP-MCNC: NEGATIVE MG/DL
LEUKOCYTE ESTERASE UR QL STRIP: ABNORMAL
NITRATE UR QL: POSITIVE
PH UR STRIP: 7 [PH] (ref 5–7)
RBC URINE: 14 /HPF
SP GR UR STRIP: 1.02 (ref 1–1.03)
SQUAMOUS EPITHELIAL: 3 /HPF
UROBILINOGEN UR STRIP-MCNC: NORMAL MG/DL
WBC CLUMPS #/AREA URNS HPF: PRESENT /HPF
WBC URINE: >182 /HPF

## 2023-11-29 PROCEDURE — 81001 URINALYSIS AUTO W/SCOPE: CPT | Mod: ORL | Performed by: NURSE PRACTITIONER

## 2023-11-29 PROCEDURE — 87086 URINE CULTURE/COLONY COUNT: CPT | Mod: ORL | Performed by: NURSE PRACTITIONER

## 2023-11-30 LAB
BACTERIA UR CULT: ABNORMAL
BACTERIA UR CULT: ABNORMAL

## 2024-02-26 ENCOUNTER — LAB REQUISITION (OUTPATIENT)
Dept: LAB | Facility: CLINIC | Age: 89
End: 2024-02-26
Payer: COMMERCIAL

## 2024-02-26 DIAGNOSIS — F03.90 UNSPECIFIED DEMENTIA, UNSPECIFIED SEVERITY, WITHOUT BEHAVIORAL DISTURBANCE, PSYCHOTIC DISTURBANCE, MOOD DISTURBANCE, AND ANXIETY (H): ICD-10-CM

## 2024-02-26 DIAGNOSIS — Z00.00 ENCOUNTER FOR GENERAL ADULT MEDICAL EXAMINATION WITHOUT ABNORMAL FINDINGS: ICD-10-CM

## 2024-02-27 ENCOUNTER — LAB REQUISITION (OUTPATIENT)
Dept: LAB | Facility: CLINIC | Age: 89
End: 2024-02-27
Payer: COMMERCIAL

## 2024-02-27 DIAGNOSIS — Z00.00 ENCOUNTER FOR GENERAL ADULT MEDICAL EXAMINATION WITHOUT ABNORMAL FINDINGS: ICD-10-CM

## 2024-02-27 DIAGNOSIS — F03.90 UNSPECIFIED DEMENTIA, UNSPECIFIED SEVERITY, WITHOUT BEHAVIORAL DISTURBANCE, PSYCHOTIC DISTURBANCE, MOOD DISTURBANCE, AND ANXIETY (H): ICD-10-CM

## 2024-02-27 LAB
ANION GAP SERPL CALCULATED.3IONS-SCNC: 12 MMOL/L (ref 7–15)
BUN SERPL-MCNC: 27.9 MG/DL (ref 8–23)
CALCIUM SERPL-MCNC: 9.2 MG/DL (ref 8.2–9.6)
CHLORIDE SERPL-SCNC: 108 MMOL/L (ref 98–107)
CREAT SERPL-MCNC: 0.98 MG/DL (ref 0.51–0.95)
DEPRECATED HCO3 PLAS-SCNC: 24 MMOL/L (ref 22–29)
EGFRCR SERPLBLD CKD-EPI 2021: 53 ML/MIN/1.73M2
ERYTHROCYTE [DISTWIDTH] IN BLOOD BY AUTOMATED COUNT: 14.6 % (ref 10–15)
GLUCOSE SERPL-MCNC: 83 MG/DL (ref 70–99)
HCT VFR BLD AUTO: 40.6 % (ref 35–47)
HGB BLD-MCNC: 13.3 G/DL (ref 11.7–15.7)
MCH RBC QN AUTO: 30.9 PG (ref 26.5–33)
MCHC RBC AUTO-ENTMCNC: 32.8 G/DL (ref 31.5–36.5)
MCV RBC AUTO: 94 FL (ref 78–100)
PLATELET # BLD AUTO: 227 10E3/UL (ref 150–450)
POTASSIUM SERPL-SCNC: 4.5 MMOL/L (ref 3.4–5.3)
RBC # BLD AUTO: 4.31 10E6/UL (ref 3.8–5.2)
SODIUM SERPL-SCNC: 144 MMOL/L (ref 135–145)
TSH SERPL DL<=0.005 MIU/L-ACNC: 3.97 UIU/ML (ref 0.3–4.2)
WBC # BLD AUTO: 7.9 10E3/UL (ref 4–11)

## 2024-02-27 PROCEDURE — 36415 COLL VENOUS BLD VENIPUNCTURE: CPT | Mod: ORL | Performed by: NURSE PRACTITIONER

## 2024-02-27 PROCEDURE — 84443 ASSAY THYROID STIM HORMONE: CPT | Mod: ORL | Performed by: NURSE PRACTITIONER

## 2024-02-27 PROCEDURE — P9604 ONE-WAY ALLOW PRORATED TRIP: HCPCS | Mod: ORL | Performed by: NURSE PRACTITIONER

## 2024-02-27 PROCEDURE — 85027 COMPLETE CBC AUTOMATED: CPT | Mod: ORL | Performed by: NURSE PRACTITIONER

## 2024-02-27 PROCEDURE — 80048 BASIC METABOLIC PNL TOTAL CA: CPT | Mod: ORL | Performed by: NURSE PRACTITIONER

## 2024-02-28 PROCEDURE — 82607 VITAMIN B-12: CPT | Mod: ORL | Performed by: NURSE PRACTITIONER

## 2024-02-28 PROCEDURE — P9603 ONE-WAY ALLOW PRORATED MILES: HCPCS | Mod: ORL | Performed by: NURSE PRACTITIONER

## 2024-02-28 PROCEDURE — 82306 VITAMIN D 25 HYDROXY: CPT | Mod: ORL | Performed by: NURSE PRACTITIONER

## 2024-02-28 PROCEDURE — 36415 COLL VENOUS BLD VENIPUNCTURE: CPT | Mod: ORL | Performed by: NURSE PRACTITIONER

## 2024-02-29 ENCOUNTER — APPOINTMENT (OUTPATIENT)
Dept: CT IMAGING | Facility: CLINIC | Age: 89
End: 2024-02-29
Attending: EMERGENCY MEDICINE
Payer: COMMERCIAL

## 2024-02-29 ENCOUNTER — HOSPITAL ENCOUNTER (EMERGENCY)
Facility: CLINIC | Age: 89
Discharge: HOME | End: 2024-02-29
Attending: EMERGENCY MEDICINE | Admitting: EMERGENCY MEDICINE
Payer: COMMERCIAL

## 2024-02-29 VITALS
OXYGEN SATURATION: 98 % | DIASTOLIC BLOOD PRESSURE: 74 MMHG | SYSTOLIC BLOOD PRESSURE: 161 MMHG | TEMPERATURE: 97.5 F | RESPIRATION RATE: 18 BRPM | HEART RATE: 72 BPM

## 2024-02-29 DIAGNOSIS — T88.7XXA MEDICATION SIDE EFFECTS: ICD-10-CM

## 2024-02-29 LAB
ALBUMIN UR-MCNC: NEGATIVE MG/DL
ANION GAP SERPL CALCULATED.3IONS-SCNC: 9 MMOL/L (ref 7–15)
APPEARANCE UR: CLEAR
ATRIAL RATE - MUSE: 63 BPM
BASOPHILS # BLD AUTO: 0 10E3/UL (ref 0–0.2)
BASOPHILS NFR BLD AUTO: 1 %
BILIRUB UR QL STRIP: NEGATIVE
BUN SERPL-MCNC: 27.2 MG/DL (ref 8–23)
CALCIUM SERPL-MCNC: 8.4 MG/DL (ref 8.2–9.6)
CHLORIDE SERPL-SCNC: 110 MMOL/L (ref 98–107)
COLOR UR AUTO: ABNORMAL
CREAT SERPL-MCNC: 0.94 MG/DL (ref 0.51–0.95)
DEPRECATED HCO3 PLAS-SCNC: 22 MMOL/L (ref 22–29)
DIASTOLIC BLOOD PRESSURE - MUSE: NORMAL MMHG
EGFRCR SERPLBLD CKD-EPI 2021: 56 ML/MIN/1.73M2
EOSINOPHIL # BLD AUTO: 0.2 10E3/UL (ref 0–0.7)
EOSINOPHIL NFR BLD AUTO: 3 %
ERYTHROCYTE [DISTWIDTH] IN BLOOD BY AUTOMATED COUNT: 14.3 % (ref 10–15)
GLUCOSE SERPL-MCNC: 108 MG/DL (ref 70–99)
GLUCOSE UR STRIP-MCNC: NEGATIVE MG/DL
HCT VFR BLD AUTO: 37.4 % (ref 35–47)
HGB BLD-MCNC: 12 G/DL (ref 11.7–15.7)
HGB UR QL STRIP: NEGATIVE
HOLD SPECIMEN: NORMAL
IMM GRANULOCYTES # BLD: 0 10E3/UL
IMM GRANULOCYTES NFR BLD: 0 %
INTERPRETATION ECG - MUSE: NORMAL
KETONES UR STRIP-MCNC: NEGATIVE MG/DL
LEUKOCYTE ESTERASE UR QL STRIP: NEGATIVE
LYMPHOCYTES # BLD AUTO: 1.3 10E3/UL (ref 0.8–5.3)
LYMPHOCYTES NFR BLD AUTO: 19 %
MCH RBC QN AUTO: 30 PG (ref 26.5–33)
MCHC RBC AUTO-ENTMCNC: 32.1 G/DL (ref 31.5–36.5)
MCV RBC AUTO: 94 FL (ref 78–100)
MONOCYTES # BLD AUTO: 0.6 10E3/UL (ref 0–1.3)
MONOCYTES NFR BLD AUTO: 8 %
MUCOUS THREADS #/AREA URNS LPF: PRESENT /LPF
NEUTROPHILS # BLD AUTO: 4.6 10E3/UL (ref 1.6–8.3)
NEUTROPHILS NFR BLD AUTO: 69 %
NITRATE UR QL: NEGATIVE
NRBC # BLD AUTO: 0 10E3/UL
NRBC BLD AUTO-RTO: 0 /100
P AXIS - MUSE: 70 DEGREES
PH UR STRIP: 6.5 [PH] (ref 5–7)
PLATELET # BLD AUTO: 187 10E3/UL (ref 150–450)
POTASSIUM SERPL-SCNC: 4.4 MMOL/L (ref 3.4–5.3)
PR INTERVAL - MUSE: 198 MS
QRS DURATION - MUSE: 80 MS
QT - MUSE: 434 MS
QTC - MUSE: 444 MS
R AXIS - MUSE: 14 DEGREES
RBC # BLD AUTO: 4 10E6/UL (ref 3.8–5.2)
RBC URINE: 0 /HPF
SODIUM SERPL-SCNC: 141 MMOL/L (ref 135–145)
SP GR UR STRIP: 1.02 (ref 1–1.03)
SQUAMOUS EPITHELIAL: <1 /HPF
SYSTOLIC BLOOD PRESSURE - MUSE: NORMAL MMHG
T AXIS - MUSE: 41 DEGREES
UROBILINOGEN UR STRIP-MCNC: NORMAL MG/DL
VENTRICULAR RATE- MUSE: 63 BPM
VIT B12 SERPL-MCNC: 379 PG/ML (ref 232–1245)
VIT D+METAB SERPL-MCNC: 35 NG/ML (ref 20–50)
WBC # BLD AUTO: 6.7 10E3/UL (ref 4–11)
WBC URINE: 3 /HPF

## 2024-02-29 PROCEDURE — 36415 COLL VENOUS BLD VENIPUNCTURE: CPT | Performed by: EMERGENCY MEDICINE

## 2024-02-29 PROCEDURE — 258N000003 HC RX IP 258 OP 636: Performed by: EMERGENCY MEDICINE

## 2024-02-29 PROCEDURE — 99285 EMERGENCY DEPT VISIT HI MDM: CPT | Mod: 25

## 2024-02-29 PROCEDURE — 85025 COMPLETE CBC W/AUTO DIFF WBC: CPT | Performed by: EMERGENCY MEDICINE

## 2024-02-29 PROCEDURE — 96360 HYDRATION IV INFUSION INIT: CPT

## 2024-02-29 PROCEDURE — 93005 ELECTROCARDIOGRAM TRACING: CPT

## 2024-02-29 PROCEDURE — 81001 URINALYSIS AUTO W/SCOPE: CPT | Performed by: EMERGENCY MEDICINE

## 2024-02-29 PROCEDURE — 70450 CT HEAD/BRAIN W/O DYE: CPT

## 2024-02-29 PROCEDURE — 82374 ASSAY BLOOD CARBON DIOXIDE: CPT | Performed by: EMERGENCY MEDICINE

## 2024-02-29 RX ADMIN — SODIUM CHLORIDE 1000 ML: 9 INJECTION, SOLUTION INTRAVENOUS at 11:47

## 2024-02-29 ASSESSMENT — ACTIVITIES OF DAILY LIVING (ADL)
ADLS_ACUITY_SCORE: 38

## 2024-02-29 ASSESSMENT — COLUMBIA-SUICIDE SEVERITY RATING SCALE - C-SSRS: IS THE PATIENT NOT ABLE TO COMPLETE C-SSRS: UNABLE TO VERBALIZE

## 2024-02-29 NOTE — ED PROVIDER NOTES
History     Chief Complaint:  Altered Mental Status       HPI   Shira Salas is a 89 year old female who presents to the ED for evaluation of altered mental status and increased sleepiness after starting Seroquel 12.5 mg twice daily last night.  Patient has had 1 dose last night and 1 dose this morning which was prescribed to help treat her anxiety.  No reports of any falls or injuries.  Patient denies any concerns including chest pain, abdominal pain.  No reports of any fever.  No numbness tingling or weakness.  The patient denies any other concerns or complaints at this time.  The daughter is not aware of any other change in symptoms.      Independent Historian:   Daughter - They report patient has been doing well until this morning.  She last saw her mother on Monday and everything seemed to be normal at that time.    Review of External Notes:  I reviewed the patient's hospital discharge summary from 9/30/2022:  At that time patient was admitted after suffering a fall which resulted in a left hip fracture and pelvic fracture.  She underwent ORIF of the left intertrochanteric femur fracture.    Medications:    aspirin (ASA) 325 MG EC tablet  Cholecalciferol (VITAMIN D3 PO)  Flaxseed, Linseed, (FLAX SEEDS PO)  ibuprofen (ADVIL/MOTRIN) 200 MG capsule  multivitamin w/minerals (THERA-VIT-M) tablet  ondansetron (ZOFRAN ODT) 4 MG ODT tab  oxyCODONE (ROXICODONE) 5 MG tablet  Probiotic Product (PROBIOTIC DAILY PO)  senna-docusate (SENOKOT-S/PERICOLACE) 8.6-50 MG tablet        Past Medical History:    Past Medical History:   Diagnosis Date    Glaucoma     Macular degeneration        Past Surgical History:    Past Surgical History:   Procedure Laterality Date    CATARACT IOL, RT/LT Bilateral     GLAUCOMA SURGERY      Ahmed Valve OS (2/15/16)    HAND SURGERY Right     HYSTERECTOMY      LASER SELECTIVE TRABECULOPLASTY Left     OPEN REDUCTION INTERNAL FIXATION HIP NAILING Left 9/27/2022    Procedure: INTERNAL FIXATION  LEFT HIP USING INTRAMEDULLARY NAIL.;  Surgeon: Ruddy Suárez MD;  Location:  OR    [facility] RAD RESEC TONSIL/PILLARS          Physical Exam   Patient Vitals for the past 24 hrs:   BP Temp Temp src Pulse Resp SpO2   02/29/24 1244 -- -- -- 72 18 98 %   02/29/24 1229 (!) 161/74 -- -- 69 (!) 9 97 %   02/29/24 1214 -- -- -- 70 20 92 %   02/29/24 1159 (!) 152/77 -- -- 63 12 94 %   02/29/24 1138 -- -- -- 64 15 97 %   02/29/24 1114 -- -- -- 62 16 93 %   02/29/24 1108 -- -- -- 65 17 93 %   02/29/24 1053 -- -- -- 63 26 95 %   02/29/24 1038 (!) 146/71 -- -- 62 26 96 %   02/29/24 1033 -- 97.5  F (36.4  C) Temporal -- -- --   02/29/24 1032 -- -- -- 63 11 97 %   02/29/24 1031 (!) 146/71 -- -- 64 -- 97 %   02/29/24 1030 (!) 150/68 -- -- 64 -- 97 %        Physical Exam  General: Elderly, well appearing. Nontoxic. Resting comfortably  Head:  Scalp, face, and head appear normal, atraumatic   Eyes:  Pupils are equal, postoperative, reactive to light. EOMI. No nystagmus.    Conjunctivae non-injected and sclerae white  ENT:    The external nose is normal    Pinnae are normal.  Mucous membranes are dry.  Neck:  Normal range of motion    There is no rigidity noted    Trachea is in the midline  CV:  Regular rate and rhythm     Normal S1/S2, no S3/S4    No murmur or rub. Radial pulses 2+ bilaterally.  Resp:  Lungs are clear and equal bilaterally  There is no tachypnea    No increased work of breathing    No rales, wheezing, or rhonchi  GI:  Abdomen is soft, no rigidity or guarding    No distension, or mass    No tenderness or rebound tenderness   MS:  Normal muscular tone    Symmetric motor strength    No lower extremity edema  Skin:  No rash or acute skin lesions noted  Neuro:  A&Ox2 (person and place), GCS 15    CN II - XII intact    Speech clear, fluent, and normal    Strength 5/5 and symmetric in bilateral upper and lower extremities.    No pronator drift. No leg drift. SILT throughout.    No ankle clonus    FTN testing normal.  No tremor.     No meningismus   Psych: Normal affect. Appropriate interactions.     Emergency Department Course     ECG results from 02/29/24   EKG 12 lead     Value    Systolic Blood Pressure     Diastolic Blood Pressure     Ventricular Rate 63    Atrial Rate 63    TN Interval 198    QRS Duration 80        QTc 444    P Axis 70    R AXIS 14    T Axis 41    Interpretation ECG      Sinus rhythm  Normal ECG  When compared with ECG of 03-SEP-2023 10:17,  No significant change was found  Interpreted by Cole Goodwin MD at 11:24 AM.          Imaging:  CT Head w/o Contrast   Final Result   IMPRESSION:    1. No acute intracranial pathology.    2. Diffuse cerebral volume loss and chronic small vessel ischemic   disease.      ANDREW COLLAZO MD            SYSTEM ID:  [id number]           Laboratory:  Labs Ordered and Resulted from Time of ED Arrival to Time of ED Departure   BASIC METABOLIC PANEL - Abnormal       Result Value    Sodium 141      Potassium 4.4      Chloride 110 (*)     Carbon Dioxide (CO2) 22      Anion Gap 9      Urea Nitrogen 27.2 (*)     Creatinine 0.94      GFR Estimate 56 (*)     Calcium 8.4      Glucose 108 (*)    ROUTINE UA WITH MICROSCOPIC REFLEX TO CULTURE - Abnormal    Color Urine Light Yellow      Appearance Urine Clear      Glucose Urine Negative      Bilirubin Urine Negative      Ketones Urine Negative      Specific Gravity Urine 1.018      Blood Urine Negative      pH Urine 6.5      Protein Albumin Urine Negative      Urobilinogen Urine Normal      Nitrite Urine Negative      Leukocyte Esterase Urine Negative      Mucus Urine Present (*)     RBC Urine 0      WBC Urine 3      Squamous Epithelials Urine <1     CBC WITH PLATELETS AND DIFFERENTIAL    WBC Count 6.7      RBC Count 4.00      Hemoglobin 12.0      Hematocrit 37.4      MCV 94      MCH 30.0      MCHC 32.1      RDW 14.3      Platelet Count 187      % Neutrophils 69      % Lymphocytes 19      % Monocytes 8      % Eosinophils 3      %  Basophils 1      % Immature Granulocytes 0      NRBCs per 100 WBC 0      Absolute Neutrophils 4.6      Absolute Lymphocytes 1.3      Absolute Monocytes 0.6      Absolute Eosinophils 0.2      Absolute Basophils 0.0      Absolute Immature Granulocytes 0.0      Absolute NRBCs 0.0          Procedures     Emergency Department Course & Assessments:             Interventions:  Medications   sodium chloride 0.9% BOLUS 1,000 mL (0 mLs Intravenous Stopped 2/29/24 1244)        Assessments, Independent Interpretation, Consults/Discussion of Management/Tests:  ED Course as of 02/29/24 1648   Thu Feb 29, 2024   1135 Patient seen and evaluated    1143 I performed an independent interpretation of the patient's Head CT. No large volume intracranial hemorrhage or midline shift seen.    1241 Patient and family updated regarding findings and plan of care.       Social Determinants of Health affecting care:  None    Disposition:  The patient was discharged to home.     Impression & Plan      Medical Decision Making:  Shira Salas is a 89 year old female who presents to the ED for evaluation of increased somnolence after starting Seroquel last night.  On my evaluation the patient is well-appearing, hemodynamically stable and afebrile.  No focal neurologic deficits.  Clinical evaluation is not consistent with stroke.  CT head is obtained and negative for any evidence of acute intracranial pathology.  No intracranial hemorrhage, mass lesion, midline shift, cerebral edema or any other acute findings.  No history or evidence of trauma on examination.  Patient is sleepy but easily responds to voice and does not otherwise appear to be encephalopathic.  She clinically appeared dehydrated and was treated with IV fluids.  Symptoms clearly started after starting Seroquel.  I recommended that Seroquel be stopped. Labs unremarkable. UA without UTI. At this time no evidence for any other acute process which requires admission to the hospital.   Patient stable for discharge back to her living facility.  Patient and patient's daughter are agreeable to plan of care.  Close return precautions were provided she was discharged in stable condition.      Diagnosis:    ICD-10-CM    1. Medication side effects  T88.7XXA            Discharge Medications:  Discharge Medication List as of 2/29/2024 12:44 PM             2/29/2024   Cole Goodwin MD Daro, Ryan Clay, MD  02/29/24 1648

## 2024-02-29 NOTE — DISCHARGE INSTRUCTIONS
STOP TAKING THE SEROQUEL WHICH IS CAUSING DROWSINESS. THE DROWSINESS AND SOMNOLENCE SHOULD RESOLVE OVER THE NEXT 24 HOURS.

## 2024-02-29 NOTE — ED NOTES
Pt daughter was here and updated on pt status and agreed to pt being transported back to care facility. Pt care facility called and updated, aware pt is returning

## 2024-02-29 NOTE — ED TRIAGE NOTES
Pt started on seroquel last night, had a dose this am as well, today very sleepy, difficult to stay awake at care facility for breakfast and vomited, pt baseline is confusion

## 2024-03-11 ENCOUNTER — DOCUMENTATION ONLY (OUTPATIENT)
Dept: OTHER | Facility: CLINIC | Age: 89
End: 2024-03-11
Payer: COMMERCIAL

## 2024-08-11 ENCOUNTER — APPOINTMENT (OUTPATIENT)
Dept: CT IMAGING | Facility: CLINIC | Age: 89
End: 2024-08-11
Attending: EMERGENCY MEDICINE
Payer: COMMERCIAL

## 2024-08-11 ENCOUNTER — HOSPITAL ENCOUNTER (EMERGENCY)
Facility: CLINIC | Age: 89
Discharge: HOME | End: 2024-08-11
Attending: EMERGENCY MEDICINE | Admitting: EMERGENCY MEDICINE
Payer: COMMERCIAL

## 2024-08-11 VITALS
DIASTOLIC BLOOD PRESSURE: 69 MMHG | TEMPERATURE: 97.8 F | OXYGEN SATURATION: 92 % | SYSTOLIC BLOOD PRESSURE: 144 MMHG | HEART RATE: 71 BPM | RESPIRATION RATE: 16 BRPM

## 2024-08-11 DIAGNOSIS — N18.31 CHRONIC RENAL FAILURE, STAGE 3A (H): ICD-10-CM

## 2024-08-11 DIAGNOSIS — R11.2 NAUSEA AND VOMITING, UNSPECIFIED VOMITING TYPE: ICD-10-CM

## 2024-08-11 LAB
ALBUMIN SERPL BCG-MCNC: 3.8 G/DL (ref 3.5–5.2)
ALP SERPL-CCNC: 67 U/L (ref 40–150)
ALT SERPL W P-5'-P-CCNC: 10 U/L (ref 0–50)
ANION GAP SERPL CALCULATED.3IONS-SCNC: 8 MMOL/L (ref 7–15)
AST SERPL W P-5'-P-CCNC: 12 U/L (ref 0–45)
BASOPHILS # BLD AUTO: 0 10E3/UL (ref 0–0.2)
BASOPHILS NFR BLD AUTO: 1 %
BILIRUB SERPL-MCNC: 0.2 MG/DL
BUN SERPL-MCNC: 32.2 MG/DL (ref 8–23)
CALCIUM SERPL-MCNC: 8.5 MG/DL (ref 8.8–10.4)
CHLORIDE SERPL-SCNC: 109 MMOL/L (ref 98–107)
CREAT SERPL-MCNC: 0.89 MG/DL (ref 0.51–0.95)
EGFRCR SERPLBLD CKD-EPI 2021: 59 ML/MIN/1.73M2
EOSINOPHIL # BLD AUTO: 0.2 10E3/UL (ref 0–0.7)
EOSINOPHIL NFR BLD AUTO: 2 %
ERYTHROCYTE [DISTWIDTH] IN BLOOD BY AUTOMATED COUNT: 12.6 % (ref 10–15)
GLUCOSE BLDC GLUCOMTR-MCNC: 116 MG/DL (ref 70–99)
GLUCOSE SERPL-MCNC: 117 MG/DL (ref 70–99)
HCO3 SERPL-SCNC: 24 MMOL/L (ref 22–29)
HCT VFR BLD AUTO: 37.3 % (ref 35–47)
HGB BLD-MCNC: 12.3 G/DL (ref 11.7–15.7)
HOLD SPECIMEN: NORMAL
HOLD SPECIMEN: NORMAL
IMM GRANULOCYTES # BLD: 0 10E3/UL
IMM GRANULOCYTES NFR BLD: 0 %
LYMPHOCYTES # BLD AUTO: 1.3 10E3/UL (ref 0.8–5.3)
LYMPHOCYTES NFR BLD AUTO: 17 %
MCH RBC QN AUTO: 31.2 PG (ref 26.5–33)
MCHC RBC AUTO-ENTMCNC: 33 G/DL (ref 31.5–36.5)
MCV RBC AUTO: 95 FL (ref 78–100)
MONOCYTES # BLD AUTO: 0.5 10E3/UL (ref 0–1.3)
MONOCYTES NFR BLD AUTO: 7 %
NEUTROPHILS # BLD AUTO: 5.7 10E3/UL (ref 1.6–8.3)
NEUTROPHILS NFR BLD AUTO: 74 %
NRBC # BLD AUTO: 0 10E3/UL
NRBC BLD AUTO-RTO: 0 /100
PLATELET # BLD AUTO: 238 10E3/UL (ref 150–450)
POTASSIUM SERPL-SCNC: 4.3 MMOL/L (ref 3.4–5.3)
PROT SERPL-MCNC: 5.9 G/DL (ref 6.4–8.3)
RBC # BLD AUTO: 3.94 10E6/UL (ref 3.8–5.2)
SODIUM SERPL-SCNC: 141 MMOL/L (ref 135–145)
WBC # BLD AUTO: 7.7 10E3/UL (ref 4–11)

## 2024-08-11 PROCEDURE — 258N000003 HC RX IP 258 OP 636: Performed by: EMERGENCY MEDICINE

## 2024-08-11 PROCEDURE — 80053 COMPREHEN METABOLIC PANEL: CPT | Performed by: EMERGENCY MEDICINE

## 2024-08-11 PROCEDURE — 85025 COMPLETE CBC W/AUTO DIFF WBC: CPT | Performed by: EMERGENCY MEDICINE

## 2024-08-11 PROCEDURE — 70450 CT HEAD/BRAIN W/O DYE: CPT

## 2024-08-11 PROCEDURE — 82962 GLUCOSE BLOOD TEST: CPT

## 2024-08-11 PROCEDURE — 99284 EMERGENCY DEPT VISIT MOD MDM: CPT | Mod: 25

## 2024-08-11 PROCEDURE — 36415 COLL VENOUS BLD VENIPUNCTURE: CPT | Performed by: EMERGENCY MEDICINE

## 2024-08-11 PROCEDURE — 96360 HYDRATION IV INFUSION INIT: CPT

## 2024-08-11 RX ORDER — ONDANSETRON 4 MG/1
4 TABLET, ORALLY DISINTEGRATING ORAL EVERY 6 HOURS PRN
Qty: 10 TABLET | Refills: 0 | Status: SHIPPED | OUTPATIENT
Start: 2024-08-11

## 2024-08-11 RX ORDER — SODIUM CHLORIDE 9 MG/ML
INJECTION, SOLUTION INTRAVENOUS CONTINUOUS
Status: DISCONTINUED | OUTPATIENT
Start: 2024-08-11 | End: 2024-08-11 | Stop reason: HOSPADM

## 2024-08-11 RX ADMIN — SODIUM CHLORIDE 500 ML: 9 INJECTION, SOLUTION INTRAVENOUS at 18:18

## 2024-08-11 RX ADMIN — SODIUM CHLORIDE: 9 INJECTION, SOLUTION INTRAVENOUS at 18:28

## 2024-08-11 ASSESSMENT — ACTIVITIES OF DAILY LIVING (ADL)
ADLS_ACUITY_SCORE: 38

## 2024-08-11 NOTE — ED NOTES
Bed: ED04  Expected date:   Expected time:   Means of arrival:   Comments:  [facility] 89F memory care, weak and vomiting

## 2024-08-11 NOTE — ED PROVIDER NOTES
Emergency Department Note      History of Present Illness     Chief Complaint   Vomiting    HPI   Shira Salas is a 89 year old female with history of dementia who presents to the ED via EMS for evaluation of vomiting. Per EMS report, the patient had 3 episodes of emesis today at the nursing home. Patient herself endorses nausea, but denies diarrhea or fever. EMS gave 8 mg of Zofran.    Independent Historian   EMS as detailed above.    Review of External Notes   I reviewed ED note from 2/29/24.    Past Medical History     Medical History and Problem List   Glaucoma  Macular degeneration   Pseudophakia of both eyes   Lung infiltrate   TMJ arthritis   Osteoporosis   Cataract  Arthritis  PONV  Disorder of bone and cartilage   Dementia     Medications   Aspirin 325 mg  Zofran  Roxicodone  Senokot    Surgical History   Bilateral cataract extraction with IOL  Glaucoma surgery  Right hand surgery  Hysterectomy   Laser selective trabeculoplasty, left   Internal fixation left hip using intramedullary nail  Right thumb joint and bone chip removed  Tonsillectomy and adenoidectomy   KPE IOL  Left ahmed valve tube insertion glaucoma implant      Physical Exam     Patient Vitals for the past 24 hrs:   BP Temp Temp src Pulse SpO2   08/11/24 1903 -- -- -- -- 97 %   08/11/24 1858 (!) 143/67 -- -- 71 --   08/11/24 1856 (!) 145/62 -- -- -- --   08/11/24 1800 (!) 155/70 -- -- -- 96 %   08/11/24 1758 (!) 146/75 -- -- -- 95 %   08/11/24 1742 -- -- -- -- 94 %   08/11/24 1741 (!) 151/71 97.8  F (36.6  C) Temporal 75 --     Physical Exam  Nursing note and vitals reviewed.    Constitutional:  Appears comfortable.    HENT:                Nose normal.  No discharge.      Dry mouth and lips.  Eyes:    Conjunctivae are normal without injection.  Pupils are equal.  Cardiovascular:  Normal rate, regular rhythm with normal S1 and S2.      Normal heart sounds and peripheral pulses 2+ and equal.    Pulmonary:  Effort normal and breath sounds  clear to auscultation bilaterally.    No respiratory distress.    GI:    Soft. No distension and no mass. No tenderness.   Musculoskeletal:  Normal range of motion. No extremity deformity.     No edema and no tenderness.    Neurological:   Alert and appropriate, with obvious memory loss. No focal weakness. Moves arms and legs normally.  Skin:    Skin is warm and dry. No rash noted. 5-6 day old yellow bruise on right forehead   Psychiatric:   Behavior is normal. Appropriate mood and affect.     Judgment and thought content normal.      Diagnostics     Lab Results   Labs Ordered and Resulted from Time of ED Arrival to Time of ED Departure   COMPREHENSIVE METABOLIC PANEL - Abnormal       Result Value    Sodium 141      Potassium 4.3      Carbon Dioxide (CO2) 24      Anion Gap 8      Urea Nitrogen 32.2 (*)     Creatinine 0.89      GFR Estimate 59 (*)     Calcium 8.5 (*)     Chloride 109 (*)     Glucose 117 (*)     Alkaline Phosphatase 67      AST 12      ALT 10      Protein Total 5.9 (*)     Albumin 3.8      Bilirubin Total 0.2     GLUCOSE BY METER - Abnormal    GLUCOSE BY METER POCT 116 (*)    CBC WITH PLATELETS AND DIFFERENTIAL    WBC Count 7.7      RBC Count 3.94      Hemoglobin 12.3      Hematocrit 37.3      MCV 95      MCH 31.2      MCHC 33.0      RDW 12.6      Platelet Count 238      % Neutrophils 74      % Lymphocytes 17      % Monocytes 7      % Eosinophils 2      % Basophils 1      % Immature Granulocytes 0      NRBCs per 100 WBC 0      Absolute Neutrophils 5.7      Absolute Lymphocytes 1.3      Absolute Monocytes 0.5      Absolute Eosinophils 0.2      Absolute Basophils 0.0      Absolute Immature Granulocytes 0.0      Absolute NRBCs 0.0       Imaging   CT Head w/o Contrast   Final Result   IMPRESSION:   1.  No finding for intracranial hemorrhage, mass, or acute infarct.      2.  Moderate volume loss and mild presumed sequela of chronic microvascular ischemic change.          Independent Interpretation   CT of  the head showed no evidence of bleed or mass.    ED Course      Medications Administered   Medications   sodium chloride 0.9 % infusion ( Intravenous $New Bag 8/11/24 1828)   sodium chloride 0.9% BOLUS 500 mL (500 mLs Intravenous $New Bag 8/11/24 1818)     Procedures  Procedures     Discussion of Management   None    ED Course   ED Course as of 08/11/24 1930   Sun Aug 11, 2024   7267 I obtained history and examined the patient as noted above.      Additional Documentation  None    Medical Decision Making / Diagnosis     CMS Diagnoses: None    MIPS       None    [facility]   Shira Salas is a 89 year old female comes in with vomiting a couple times today and nausea started later in the day.  She got Zofran in the emesis feeling better.  According to her son, she occasionally has some dizziness that is associate with nausea.  She has Zofran as a as needed but the nursing staff chose not to give her any today and rather send her to the ER.  Her labs are stable with chronic renal failure white count is normal she does not have any focal abdominal pain.  I do not feel she needs a CT.  No evidence of bowel obstruction.  She is tolerating a p.o. challenge now and she did get some IV fluids.  She will be sent back to the memory care unit with instructions to use the Zofran in the future if she has nausea and vomiting before sending her to the ER.  Her son is in agreement with this plan.    Clear liquids tonight, gradually advance your diet as tolerated over the next day.  Use the Zofran under the tongue as needed for nausea if you have further symptoms.  If you continue to have problems with vomit, you need reevaluation in the clinic with your doctor.    Disposition   The patient was discharged.     Diagnosis     ICD-10-CM    1. Nausea and vomiting, unspecified vomiting type  R11.2       2. Chronic renal failure, stage 3a (H)  N18.31            Discharge Medications   New Prescriptions    No medications on file     Scribe  Disclosure:  I, MILTON FINK, am serving as a scribe at 5:54 PM on 8/11/2024 to document services personally performed by Jesi Magdaleno MD based on my observations and the provider's statements to me.      Jesi Magdaleno MD  08/11/24 1930

## 2024-08-11 NOTE — ED TRIAGE NOTES
Pt arrived via EMS. Hx of dementia. Staff at nursing home noted vomiting few times today; denies having other sx. Per staff, covid positive last week.   Received 8mg Zofran and NS 200cc in the ambulance.

## 2024-08-12 NOTE — DISCHARGE INSTRUCTIONS
Clear liquids tonight, gradually advance your diet as tolerated over the next day.  Use the Zofran under the tongue as needed for nausea if you have further symptoms.  If you continue to have problems with vomit, you need reevaluation in the clinic with your doctor.

## (undated) DEVICE — CAST PADDING 4" UNSTERILE 9044

## (undated) DEVICE — STPL SKIN 35W 6.9MM  PXW35

## (undated) DEVICE — WIRE GUIDE 3.2X400MM  357.399

## (undated) DEVICE — ESU GROUND PAD UNIVERSAL W/O CORD

## (undated) DEVICE — DRILL BIT CANNULATED 16MM FLEX

## (undated) DEVICE — GLOVE PROTEXIS POWDER FREE 6.5 ORTHOPEDIC 2D73ET65

## (undated) DEVICE — SOL NACL 0.9% IRRIG 1000ML BOTTLE 2F7124

## (undated) DEVICE — CAST PADDING 4" COTTON WEBRIL UNSTERILE 9084

## (undated) DEVICE — LINEN TOWEL PACK X5 5464

## (undated) DEVICE — GLOVE PROTEXIS BLUE W/NEU-THERA 7.0  2D73EB70

## (undated) DEVICE — GLOVE PROTEXIS BLUE W/NEU-THERA 8.0  2D73EB80

## (undated) DEVICE — PACK HIP NAILING SOP15HNSB

## (undated) DEVICE — GLOVE PROTEXIS W/NEU-THERA 8.0  2D73TE80

## (undated) DEVICE — GLOVE PROTEXIS POWDER FREE 7.5 ORTHOPEDIC 2D73ET75

## (undated) DEVICE — DRSG ABDOMINAL 07 1/2X8" 7197D

## (undated) DEVICE — GOWN IMPERVIOUS SPECIALTY XL/XLONG 39049

## (undated) DEVICE — SU MONOCRYL 2-0 CT-2 27" Y333H

## (undated) DEVICE — SU VICRYL 0 CT-1 27" J340H

## (undated) DEVICE — SOL WATER IRRIG 1000ML BOTTLE 2F7114

## (undated) DEVICE — SPONGE LAP 18X18" X8435

## (undated) DEVICE — PREP DURAPREP 26ML APL 8630

## (undated) DEVICE — DRSG AQUACEL AG HYDROFIBER 3.5X6" 422604

## (undated) DEVICE — DRILL BIT QUICK COUPLING 3 FLUTE 4.2MMX330/100MM CALIBRATE

## (undated) DEVICE — GLOVE PROTEXIS W/NEU-THERA 7.0  2D73TE70

## (undated) DEVICE — SU VICRYL 2-0 CT-1 27" UND J259H

## (undated) RX ORDER — FENTANYL CITRATE 50 UG/ML
INJECTION, SOLUTION INTRAMUSCULAR; INTRAVENOUS
Status: DISPENSED
Start: 2022-09-27

## (undated) RX ORDER — PROPOFOL 10 MG/ML
INJECTION, EMULSION INTRAVENOUS
Status: DISPENSED
Start: 2022-09-27

## (undated) RX ORDER — DEXAMETHASONE SODIUM PHOSPHATE 4 MG/ML
INJECTION, SOLUTION INTRA-ARTICULAR; INTRALESIONAL; INTRAMUSCULAR; INTRAVENOUS; SOFT TISSUE
Status: DISPENSED
Start: 2022-09-27

## (undated) RX ORDER — ONDANSETRON 2 MG/ML
INJECTION INTRAMUSCULAR; INTRAVENOUS
Status: DISPENSED
Start: 2022-09-27

## (undated) RX ORDER — LIDOCAINE HYDROCHLORIDE 20 MG/ML
INJECTION, SOLUTION EPIDURAL; INFILTRATION; INTRACAUDAL; PERINEURAL
Status: DISPENSED
Start: 2022-09-27

## (undated) RX ORDER — HYDROMORPHONE HCL IN WATER/PF 6 MG/30 ML
PATIENT CONTROLLED ANALGESIA SYRINGE INTRAVENOUS
Status: DISPENSED
Start: 2022-09-27